# Patient Record
Sex: MALE | Race: WHITE | Employment: OTHER | URBAN - METROPOLITAN AREA
[De-identification: names, ages, dates, MRNs, and addresses within clinical notes are randomized per-mention and may not be internally consistent; named-entity substitution may affect disease eponyms.]

---

## 2017-01-06 ENCOUNTER — HOSPITAL ENCOUNTER (EMERGENCY)
Age: 67
Discharge: HOME | End: 2017-01-06
Attending: EMERGENCY MEDICINE | Admitting: EMERGENCY MEDICINE
Payer: MEDICARE

## 2017-01-06 ENCOUNTER — APPOINTMENT (OUTPATIENT)
Dept: GENERAL RADIOLOGY | Age: 67
End: 2017-01-06
Attending: EMERGENCY MEDICINE
Payer: MEDICARE

## 2017-01-06 VITALS
WEIGHT: 256 LBS | HEIGHT: 69 IN | BODY MASS INDEX: 37.92 KG/M2 | OXYGEN SATURATION: 93 % | TEMPERATURE: 98.2 F | HEART RATE: 61 BPM | DIASTOLIC BLOOD PRESSURE: 60 MMHG | RESPIRATION RATE: 16 BRPM | SYSTOLIC BLOOD PRESSURE: 124 MMHG

## 2017-01-06 DIAGNOSIS — J44.1 COPD EXACERBATION (HCC): Primary | ICD-10-CM

## 2017-01-06 LAB
ALBUMIN SERPL BCP-MCNC: 3.7 G/DL (ref 3.5–5)
ALBUMIN/GLOB SERPL: 1 {RATIO} (ref 1.1–2.2)
ALP SERPL-CCNC: 109 U/L (ref 45–117)
ALT SERPL-CCNC: 36 U/L (ref 12–78)
ANION GAP BLD CALC-SCNC: 9 MMOL/L (ref 5–15)
ARTERIAL PATENCY WRIST A: YES
AST SERPL W P-5'-P-CCNC: 24 U/L (ref 15–37)
BASE EXCESS BLDA CALC-SCNC: 3.1 MMOL/L
BASOPHILS # BLD AUTO: 0 K/UL (ref 0–0.1)
BASOPHILS # BLD: 0 % (ref 0–1)
BDY SITE: ABNORMAL
BILIRUB SERPL-MCNC: 0.9 MG/DL (ref 0.2–1)
BNP SERPL-MCNC: 420 PG/ML (ref 0–125)
BUN SERPL-MCNC: 19 MG/DL (ref 6–20)
BUN/CREAT SERPL: 16 (ref 12–20)
CALCIUM SERPL-MCNC: 10.1 MG/DL (ref 8.5–10.1)
CHLORIDE SERPL-SCNC: 96 MMOL/L (ref 97–108)
CK MB CFR SERPL CALC: 1.5 % (ref 0–2.5)
CK MB SERPL-MCNC: 6.7 NG/ML (ref 5–25)
CK SERPL-CCNC: 460 U/L (ref 39–308)
CO2 SERPL-SCNC: 29 MMOL/L (ref 21–32)
CREAT SERPL-MCNC: 1.17 MG/DL (ref 0.7–1.3)
EOSINOPHIL # BLD: 0.1 K/UL (ref 0–0.4)
EOSINOPHIL NFR BLD: 1 % (ref 0–7)
ERYTHROCYTE [DISTWIDTH] IN BLOOD BY AUTOMATED COUNT: 14.5 % (ref 11.5–14.5)
GLOBULIN SER CALC-MCNC: 3.7 G/DL (ref 2–4)
GLUCOSE SERPL-MCNC: 213 MG/DL (ref 65–100)
HCO3 BLDA-SCNC: 29 MMOL/L (ref 22–26)
HCT VFR BLD AUTO: 35.6 % (ref 36.6–50.3)
HGB BLD-MCNC: 12.3 G/DL (ref 12.1–17)
LYMPHOCYTES # BLD AUTO: 16 % (ref 12–49)
LYMPHOCYTES # BLD: 2.3 K/UL (ref 0.8–3.5)
MCH RBC QN AUTO: 30 PG (ref 26–34)
MCHC RBC AUTO-ENTMCNC: 34.6 G/DL (ref 30–36.5)
MCV RBC AUTO: 86.8 FL (ref 80–99)
MONOCYTES # BLD: 1.1 K/UL (ref 0–1)
MONOCYTES NFR BLD AUTO: 8 % (ref 5–13)
NEUTS SEG # BLD: 10.9 K/UL (ref 1.8–8)
NEUTS SEG NFR BLD AUTO: 75 % (ref 32–75)
PCO2 BLDA: 49 MMHG (ref 35–45)
PH BLDA: 7.39 [PH] (ref 7.35–7.45)
PLATELET # BLD AUTO: 311 K/UL (ref 150–400)
PO2 BLDA: 71 MMHG (ref 80–100)
POTASSIUM SERPL-SCNC: 3.7 MMOL/L (ref 3.5–5.1)
PROT SERPL-MCNC: 7.4 G/DL (ref 6.4–8.2)
RBC # BLD AUTO: 4.1 M/UL (ref 4.1–5.7)
SAO2 % BLD: 94 % (ref 92–97)
SAO2% DEVICE SAO2% SENSOR NAME: ABNORMAL
SODIUM SERPL-SCNC: 134 MMOL/L (ref 136–145)
SPECIMEN SITE: ABNORMAL
TROPONIN I SERPL-MCNC: <0.04 NG/ML
WBC # BLD AUTO: 14.3 K/UL (ref 4.1–11.1)

## 2017-01-06 PROCEDURE — 99285 EMERGENCY DEPT VISIT HI MDM: CPT

## 2017-01-06 PROCEDURE — 84484 ASSAY OF TROPONIN QUANT: CPT | Performed by: EMERGENCY MEDICINE

## 2017-01-06 PROCEDURE — 93971 EXTREMITY STUDY: CPT

## 2017-01-06 PROCEDURE — 82550 ASSAY OF CK (CPK): CPT | Performed by: EMERGENCY MEDICINE

## 2017-01-06 PROCEDURE — 36415 COLL VENOUS BLD VENIPUNCTURE: CPT | Performed by: EMERGENCY MEDICINE

## 2017-01-06 PROCEDURE — 85025 COMPLETE CBC W/AUTO DIFF WBC: CPT | Performed by: EMERGENCY MEDICINE

## 2017-01-06 PROCEDURE — 93005 ELECTROCARDIOGRAM TRACING: CPT

## 2017-01-06 PROCEDURE — 80053 COMPREHEN METABOLIC PANEL: CPT | Performed by: EMERGENCY MEDICINE

## 2017-01-06 PROCEDURE — 83880 ASSAY OF NATRIURETIC PEPTIDE: CPT | Performed by: EMERGENCY MEDICINE

## 2017-01-06 PROCEDURE — 82803 BLOOD GASES ANY COMBINATION: CPT | Performed by: EMERGENCY MEDICINE

## 2017-01-06 PROCEDURE — 71010 XR CHEST PORT: CPT

## 2017-01-06 PROCEDURE — 36600 WITHDRAWAL OF ARTERIAL BLOOD: CPT | Performed by: EMERGENCY MEDICINE

## 2017-01-06 RX ORDER — AZITHROMYCIN 250 MG/1
TABLET, FILM COATED ORAL
Qty: 6 TAB | Refills: 0 | Status: SHIPPED | OUTPATIENT
Start: 2017-01-06 | End: 2017-12-30

## 2017-01-06 RX ORDER — SODIUM CHLORIDE 0.9 % (FLUSH) 0.9 %
5-10 SYRINGE (ML) INJECTION EVERY 8 HOURS
Status: DISCONTINUED | OUTPATIENT
Start: 2017-01-06 | End: 2017-01-06 | Stop reason: HOSPADM

## 2017-01-06 RX ORDER — SODIUM CHLORIDE 0.9 % (FLUSH) 0.9 %
5-10 SYRINGE (ML) INJECTION AS NEEDED
Status: DISCONTINUED | OUTPATIENT
Start: 2017-01-06 | End: 2017-01-06 | Stop reason: HOSPADM

## 2017-01-06 RX ORDER — METHYLPREDNISOLONE 4 MG/1
TABLET ORAL
Qty: 1 DOSE PACK | Refills: 0 | Status: SHIPPED | OUTPATIENT
Start: 2017-01-06 | End: 2017-12-10

## 2017-01-06 NOTE — ED NOTES
Pt reported x1 hour before he waiting for the bus to come to ER for his right leg pain and he started sob,so he called EMS to come here. Denies fever,chills,chest pain. C/o right leg pain,its swollen and red. Hx of blood clot in this leg. Emergency Department Nursing Plan of Care       The Nursing Plan of Care is developed from the Nursing assessment and Emergency Department Attending provider initial evaluation. The plan of care may be reviewed in the ED Provider note.     The Plan of Care was developed with the following considerations:   Patient / Family readiness to learn indicated by:verbalized understanding  Persons(s) to be included in education: patient  Barriers to Learning/Limitations:No    Signed     Franci Au RN    1/6/2017   10:35 AM

## 2017-01-06 NOTE — ED PROVIDER NOTES
Patient is a 77 y.o. male presenting with shortness of breath and leg pain. The history is provided by the patient. No  was used. Shortness of Breath   This is a new problem. The current episode started 2 days ago. The problem has not changed since onset. Associated symptoms include cough, sputum production, orthopnea, leg pain and leg swelling. Pertinent negatives include no fever and no chest pain. Risk factors include smoking and recent prolonged sitting. Treatments tried: xaralto. He has had prior hospitalizations. Associated medical issues include COPD. Leg Pain       Pt poor historian, widely distributed medical care. Presents today complaining of increased SOB and leg swelling after flying from [place]. States he takes xarelto for DVTs in  and [place], no PE. States he had stents [place] in [place]. Some orthopnea, but mostly cough, no CP. Past Medical History:   Diagnosis Date    COPD      bronchitis    Diabetes ([facility] [address]        Past Surgical History:   Procedure Laterality Date    Hx coronary stent placement           History reviewed. No pertinent family history. Social History     Social History    Marital status: SINGLE     Spouse name: N/A    Number of children: N/A    Years of education: N/A     Occupational History    Not on file. Social History Main Topics    Smoking status: Current Every Day Smoker     Packs/day: 1.00    Smokeless tobacco: Never Used    Alcohol use No    Drug use: Yes     Special: Marijuana    Sexual activity: Not Currently     Other Topics Concern    Not on file     Social History Narrative         ALLERGIES: Review of patient's allergies indicates no known allergies. Review of Systems   Constitutional: Negative for fever. Respiratory: Positive for cough, sputum production and shortness of breath. Cardiovascular: Positive for orthopnea and leg swelling. Negative for chest pain.    All other systems reviewed and are negative. Vitals:    01/06/17 1026   BP: 146/61   Pulse: 81   Resp: 16   Temp: 98.2 °F (36.8 °C)   SpO2: 100%   Weight: 116.1 kg (256 lb)   Height: 5' 9\" (1.753 m)            Physical Exam   Constitutional: He is oriented to person, place, and time. He appears well-developed and well-nourished. Unkempt white male, in NAD   HENT:   Head: Normocephalic and atraumatic. Nose: Nose normal.   Mouth/Throat: Oropharynx is clear and moist. No oropharyngeal exudate. Eyes: Conjunctivae and EOM are normal. Pupils are equal, round, and reactive to light. Right eye exhibits no discharge. Left eye exhibits no discharge. No scleral icterus. Neck: Normal range of motion. Neck supple. Cardiovascular: Normal rate, regular rhythm and normal heart sounds. No murmur heard. Pulmonary/Chest: Effort normal and breath sounds normal. No respiratory distress. He has no wheezes. He has no rales. Congested cough   Abdominal: Soft. He exhibits no distension. There is no tenderness. Obese     Musculoskeletal: Normal range of motion. He exhibits edema. He exhibits no tenderness. Redness bilat LE with trace-1+ pitting edema, no palp cord   Lymphadenopathy:     He has no cervical adenopathy. Neurological: He is alert and oriented to person, place, and time. Skin: Skin is warm and dry. No rash noted. He is not diaphoretic. There is erythema. Psychiatric: He has a normal mood and affect. His behavior is normal.   Nursing note and vitals reviewed.        MDM  ED Course       Procedures

## 2017-01-06 NOTE — PROCEDURES
[facility]  *** FINAL REPORT ***    Name: Tonya Christiansen  MRN: [id number]  : 15 Feb 1950  HIS Order #: [id number]  [address] [facility] Visit #: [id number]  Date: 2017    TYPE OF TEST: Peripheral Venous Testing    REASON FOR TEST  Pain in limb, Limb swelling    Right Leg:-  Deep venous thrombosis:           No  Superficial venous thrombosis:    No  Deep venous insufficiency:        Not examined  Superficial venous insufficiency: Not examined      INTERPRETATION/FINDINGS  Right leg :  1. Deep vein(s) visualized include the common femoral, proximal  femoral, mid femoral, distal femoral, popliteal(above knee),  popliteal(fossa), popliteal(below knee), posterior tibial and peroneal   veins. 2. No evidence of deep venous thrombosis detected in the veins  visualized. 3. No evidence of deep vein thrombosis in the contralateral common  femoral vein. 4. Superficial vein(s) visualized include the great saphenous vein. 5. No evidence of superficial thrombosis detected. ADDITIONAL COMMENTS    I have personally reviewed the data relevant to the interpretation of  this  study. TECHNOLOGIST: NICOLA Bull  Signed: 2017 12:18 PM    PHYSICIAN: Salena Cunha.  Corrie Coffman MD  Signed: 2017 04:49 PM

## 2017-01-06 NOTE — DISCHARGE INSTRUCTIONS
Chronic Obstructive Pulmonary Disease (COPD): Care Instructions  Your Care Instructions    Chronic obstructive pulmonary disease (COPD) is a general term for a group of lung diseases, including emphysema and chronic bronchitis. People with COPD have decreased airflow in and out of the lungs, which makes it hard to breathe. The airways also can get clogged with thick mucus. Cigarette smoking is a major cause of COPD. Although there is no cure for COPD, you can slow its progress. Following your treatment plan and taking care of yourself can help you feel better and live longer. Follow-up care is a key part of your treatment and safety. Be sure to make and go to all appointments, and call your doctor if you are having problems. It's also a good idea to know your test results and keep a list of the medicines you take. How can you care for yourself at home? Staying healthy  · Do not smoke. This is the most important step you can take to prevent more damage to your lungs. If you need help quitting, talk to your doctor about stop-smoking programs and medicines. These can increase your chances of quitting for good. · Avoid colds and flu. Get a pneumococcal vaccine shot. If you have had one before, ask your doctor whether you need a second dose. Get the flu vaccine every fall. If you must be around people with colds or the flu, wash your hands often. · Avoid secondhand smoke, air pollution, and high altitudes. Also avoid cold, dry air and hot, humid air. Stay at home with your windows closed when air pollution is bad. Medicines and oxygen therapy  · Take your medicines exactly as prescribed. Call your doctor if you think you are having a problem with your medicine. · You may be taking medicines such as:  ¨ Bronchodilators. These help open your airways and make breathing easier. Bronchodilators are either short-acting (work for 6 to 9 hours) or long-acting (work for 24 hours).  You inhale most bronchodilators, so they start to act quickly. Always carry your quick-relief inhaler with you in case you need it while you are away from home. ¨ Corticosteroids (prednisone, budesonide). These reduce airway inflammation. They come in pill or inhaled form. You must take these medicines every day for them to work well. · A spacer may help you get more inhaled medicine to your lungs. Ask your doctor or pharmacist if a spacer is right for you. If it is, ask how to use it properly. · Do not take any vitamins, over-the-counter medicine, or herbal products without talking to your doctor first.  · If your doctor prescribed antibiotics, take them as directed. Do not stop taking them just because you feel better. You need to take the full course of antibiotics. · Oxygen therapy boosts the amount of oxygen in your blood and helps you breathe easier. Use the flow rate your doctor has recommended, and do not change it without talking to your doctor first.  Activity  · Get regular exercise. Walking is an easy way to get exercise. Start out slowly, and walk a little more each day. · Pay attention to your breathing. You are exercising too hard if you cannot talk while you are exercising. · Take short rest breaks when doing household chores and other activities. · Learn breathing methods--such as breathing through pursed lips--to help you become less short of breath. · If your doctor has not set you up with a pulmonary rehabilitation program, talk to him or her about whether rehab is right for you. Rehab includes exercise programs, education about your disease and how to manage it, help with diet and other changes, and emotional support. Diet  · Eat regular, healthy meals. Use bronchodilators about 1 hour before you eat to make it easier to eat. Eat several small meals instead of three large ones. Drink beverages at the end of the meal. Avoid foods that are hard to chew.   · Eat foods that contain protein so that you do not lose muscle mass.  Mental health  · Talk to your family, friends, or a therapist about your feelings. It is normal to feel frightened, angry, hopeless, helpless, and even guilty. Talking openly about bad feelings can help you cope. If these feelings last, talk to your doctor. When should you call for help? Call 911 anytime you think you may need emergency care. For example, call if:  · You have severe trouble breathing. Call your doctor now or seek immediate medical care if:  · You have new or worse trouble breathing. · You cough up blood. · You have a fever. Watch closely for changes in your health, and be sure to contact your doctor if:  · You cough more deeply or more often, especially if you notice more mucus or a change in the color of your mucus. · You have new or worse swelling in your legs or belly. · You are not getting better as expected. Where can you learn more? Go to http://dank-damien.info/. Mehnaz Colon in the search box to learn more about \"Chronic Obstructive Pulmonary Disease (COPD): Care Instructions. \"  Current as of: May 23, 2016  Content Version: 11.1  © 7835-4740 [employer]. Care instructions adapted under license by [employer] (which disclaims liability or warranty for this information). If you have questions about a medical condition or this instruction, always ask your healthcare professional. [address] any warranty or liability for your use of this information.

## 2017-01-06 NOTE — CONSULTS
Cardiology consultation:    I was asked by Dr. Quezada to assess this 78-year-old male who came to the emergency room with complaints related to leg swelling and pain. He appears to have cellulitis in the right lower leg, and he is supposed to be on treatment with 1 of the novel anticoagulants for proven DVT. Unfortunately, he travels a great deal and he has had hospital care in several major cities around the [facility] over the last few months. He gives a history of having been treated in [place] more recently but they deny any record of contact with this patient. He seems to have some cognitive challenges and he is also hypersomnolent, frequently falling asleep during the assessment. Cardiology consultation was requested because of an abnormal electrocardiogram performed as part of routine surveillance during the emergency room visit. The patient denies chest pain as well as dyspnea. He denies palpitations and syncope. He is probably quite inactive because of his leg but he denies any other source of activity intolerance. He denies PND and orthopnea. He does however admit to previous PCI, stating that he has a stent that was placed inside of an older stent suggesting that he has had in-stent restenosis. He is quite unclear as to where this may have been done. It is impossible to glean specific history regarding what his symptoms might have been like before PCI was done. On examination, the patient is breathing comfortably in supine position with a respiratory rate of 16/min. Blood pressure is 124/60. Pulse is 62 and regular. Peripheral pulses are intact. His right leg is inflamed with pebbly skin below the knee in the tibial area and with considerable calf tenderness although specific cord structures were not palpable. The left leg is somewhat edematous but without any sign of lymphedema and without tenderness or redness. Tibial pulses are present on both sides.   There is minimal pedal edema without inflammation. The area of inflammation is demarcated below the knee and at the ankle. Lungs are clear to auscultation with limited cooperation for inspiratory effort. Cardiac auscultation shows regular rhythm with diminished volume of S1-S2, no audible murmur or gallop, no ectopy. Neck vessels are normal.    Twelve-lead EKG shows sinus rhythm with very small Q waves of insignificant proportions in the inferior leads. There is also inferior and slight lateral downward convex ST segment elevation that could suggest a variant early repolarization abnormality. The contour does not strongly suggest an ischemic origin. Precordial R-wave progression is normal, corrected QT interval was normal.  No arrhythmias have been observed. A second electrocardiogram closely matches the first without any sign of progressive changes. White blood cell count is 14.3 with 75% neutrophils, no immature forms. Hemoglobin is 12.3 with normocytic indices. Potassium is 3.7 with sodium of 134. Creatinine is 1.17. Albumin is normal at 3.7. CK is 460 with mild elevation of MB, troponin is normal.  NT proBNP is 420. Chest x-ray  shows mildly prominent left ventricle, otherwise unremarkable findings:            Venous ultrasound shows no sign of deep vein thrombosis.     Impression: History of coronary artery disease with multiple PCI    EKG findings are probably baseline observations, there is no sign of ischemic activity at this time    Patient should be counseled that he needs centralized medical management and that fragmented care due to random travel is dangerous    Cellulitis left lower extremity with no sign of venous thrombosis    Suggest: The EKG does not show evolutionary changes and cardiac markers are more suggestive of a skeletal abnormality    No further cardiac testing is suggested at this time    Thank you for this consultation    Jeremy Bryant MD [facility]

## 2017-01-08 LAB
ATRIAL RATE: 63 BPM
ATRIAL RATE: 63 BPM
CALCULATED P AXIS, ECG09: 35 DEGREES
CALCULATED P AXIS, ECG09: 41 DEGREES
CALCULATED R AXIS, ECG10: 45 DEGREES
CALCULATED R AXIS, ECG10: 49 DEGREES
CALCULATED T AXIS, ECG11: 52 DEGREES
CALCULATED T AXIS, ECG11: 57 DEGREES
DIAGNOSIS, 93000: NORMAL
DIAGNOSIS, 93000: NORMAL
P-R INTERVAL, ECG05: 180 MS
P-R INTERVAL, ECG05: 184 MS
Q-T INTERVAL, ECG07: 408 MS
Q-T INTERVAL, ECG07: 408 MS
QRS DURATION, ECG06: 94 MS
QRS DURATION, ECG06: 96 MS
QTC CALCULATION (BEZET), ECG08: 417 MS
QTC CALCULATION (BEZET), ECG08: 417 MS
VENTRICULAR RATE, ECG03: 63 BPM
VENTRICULAR RATE, ECG03: 63 BPM

## 2017-12-10 ENCOUNTER — HOSPITAL ENCOUNTER (EMERGENCY)
Age: 67
Discharge: HOME | End: 2017-12-10
Attending: EMERGENCY MEDICINE
Payer: MEDICARE

## 2017-12-10 VITALS
SYSTOLIC BLOOD PRESSURE: 152 MMHG | HEIGHT: 69 IN | HEART RATE: 66 BPM | DIASTOLIC BLOOD PRESSURE: 80 MMHG | RESPIRATION RATE: 18 BRPM | WEIGHT: 233 LBS | OXYGEN SATURATION: 98 % | BODY MASS INDEX: 34.51 KG/M2 | TEMPERATURE: 97.2 F
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
## 2017-12-10 DIAGNOSIS — R21 RASH: Primary | ICD-10-CM

## 2017-12-10 PROCEDURE — A9270 NON-COVERED ITEM OR SERVICE: HCPCS | Performed by: EMERGENCY MEDICINE

## 2017-12-10 PROCEDURE — 74011636637 HC RX REV CODE- 636/637: Performed by: EMERGENCY MEDICINE

## 2017-12-10 PROCEDURE — 99283 EMERGENCY DEPT VISIT LOW MDM: CPT

## 2017-12-10 PROCEDURE — 74011250637 HC RX REV CODE- 250/637: Performed by: EMERGENCY MEDICINE

## 2017-12-10 RX ORDER — TRIAMCINOLONE ACETONIDE 0.25 MG/G
OINTMENT TOPICAL 2 TIMES DAILY
Qty: 30 G | Refills: 0 | Status: SHIPPED | OUTPATIENT
Start: 2017-12-10 | End: 2018-01-12 | Stop reason: ALTCHOICE

## 2017-12-10 RX ORDER — FAMOTIDINE 20 MG/1
20 TABLET, FILM COATED ORAL 2 TIMES DAILY
Qty: 20 TAB | Refills: 0 | Status: SHIPPED | OUTPATIENT
Start: 2017-12-10 | End: 2017-12-20

## 2017-12-10 RX ORDER — DIPHENHYDRAMINE HCL 25 MG
25 TABLET ORAL
Qty: 20 TAB | Refills: 0 | Status: SHIPPED | OUTPATIENT
Start: 2017-12-10 | End: 2018-01-08 | Stop reason: SDUPTHER

## 2017-12-10 RX ORDER — LORATADINE 10 MG/1
10 TABLET ORAL ONCE
Status: COMPLETED | OUTPATIENT
Start: 2017-12-10 | End: 2017-12-10

## 2017-12-10 RX ORDER — PREDNISONE 20 MG/1
40 TABLET ORAL
Status: COMPLETED | OUTPATIENT
Start: 2017-12-10 | End: 2017-12-10

## 2017-12-10 RX ORDER — FAMOTIDINE 20 MG/1
20 TABLET, FILM COATED ORAL
Status: COMPLETED | OUTPATIENT
Start: 2017-12-10 | End: 2017-12-10

## 2017-12-10 RX ORDER — PREDNISONE 20 MG/1
20 TABLET ORAL DAILY
Qty: 6 TAB | Refills: 0 | Status: SHIPPED | OUTPATIENT
Start: 2017-12-10 | End: 2017-12-16

## 2017-12-10 RX ADMIN — FAMOTIDINE 20 MG: 20 TABLET, FILM COATED ORAL at 02:36

## 2017-12-10 RX ADMIN — LORATADINE 10 MG: 10 TABLET ORAL at 02:36

## 2017-12-10 RX ADMIN — PREDNISONE 40 MG: 20 TABLET ORAL at 02:36

## 2017-12-10 NOTE — ED PROVIDER NOTES
EMERGENCY DEPARTMENT HISTORY AND PHYSICAL EXAM      Date: 12/10/2017  Patient Name: Sagar Cid    History of Presenting Illness     Chief Complaint   Patient presents with    Rash     Pt with c/o red itchy rash all over his body since last Thursday. Pt states the place where he stays has bed bugs. History Provided By: Patient    HPI: Sagar Cid, 79 y.o. male with PMHx significant for COPD and DM who presents ambulatory to the ED with cc of gradually worsening diffuse rash x 1 week. He denies any associated symptoms. Pt denies use of medication or any other modifying factors. He reports seeing bed bug at the place where he is currently living. Pt denies hx of similar symptoms. He specifically denies any fevers, chills, nausea, vomiting, abdominal pain, CP, SOB, or HA. Pt states fsbs this evening was 99 prior to arrival    + tobacco use (1 ppd), - EtOH use, + Illicit drug use    PCP: None    There are no other complaints, changes, or physical findings at this time. Current Facility-Administered Medications   Medication Dose Route Frequency Provider Last Rate Last Dose    predniSONE (DELTASONE) tablet 40 mg  40 mg Oral NOW Hemalatha Johnson MD        famotidine (PEPCID) tablet 20 mg  20 mg Oral NOW Hemalatha Johnson MD        loratadine (CLARITIN) tablet 10 mg  10 mg Oral ONCE Hemalatha Johnson MD         Current Outpatient Prescriptions   Medication Sig Dispense Refill    diphenhydrAMINE (BENADRYL) 25 mg tablet Take 1 Tab by mouth every six (6) hours as needed for Sleep or Itching. 20 Tab 0    predniSONE (DELTASONE) 20 mg tablet Take 1 Tab by mouth daily for 6 days. With Breakfast 6 Tab 0    famotidine (PEPCID) 20 mg tablet Take 1 Tab by mouth two (2) times a day for 10 days. 20 Tab 0    triamcinolone acetonide (KENALOG) 0.025 % ointment Apply  to affected area two (2) times a day.  use thin layer 30 g 0    azithromycin (ZITHROMAX Z-EMILIANA) 250 mg tablet Take two tablets today then one tablet daily 6 Tab 0    metFORMIN (GLUCOPHAGE) 500 mg tablet Take  by mouth two (2) times daily (with meals).  aspirin delayed-release 81 mg tablet Take  by mouth daily.  clopidogrel (PLAVIX) 75 mg tablet Take  by mouth daily.  tiotropium (SPIRIVA) 18 mcg inhalation capsule Take 1 Cap by inhalation daily.  fluticasone-salmeterol (ADVAIR) 100-50 mcg/dose diskus inhaler Take 1 Puff by inhalation every twelve (12) hours.  insulin aspart (NOVOLOG) 100 unit/mL injection by SubCUTAneous route.  rivaroxaban (XARELTO) 10 mg tablet Take 20 mg by mouth daily.  metoprolol tartrate (LOPRESSOR) 25 mg tablet Take 25 mg by mouth two (2) times a day.  losartan-hydrochlorothiazide (HYZAAR) 50-12.5 mg per tablet Take 1 Tab by mouth daily.  atorvastatin (LIPITOR) 20 mg tablet Take 20 mg by mouth daily.  nitroglycerin (NITROSTAT) 0.4 mg SL tablet 0.4 mg by SubLINGual route every five (5) minutes as needed for Chest Pain.  acetaminophen (TYLENOL) 325 mg tablet Take 325 mg by mouth every four (4) hours as needed for Pain.  nicotine (NICODERM CQ) 7 mg/24 hr 1 Patch by TransDERmal route every twenty-four (24) hours.  oxyCODONE-acetaminophen (PERCOCET) 5-325 mg per tablet Take 1 Tab by mouth every four (4) hours as needed for Pain.  cyclobenzaprine (FLEXERIL) 10 mg tablet Take 10 mg by mouth three (3) times daily as needed for Muscle Spasm(s).  albuterol (PROVENTIL HFA, VENTOLIN HFA, PROAIR HFA) 90 mcg/actuation inhaler Take 2 Puffs by inhalation.  terbinafine HCl (LAMISIL) 1 % topical cream Apply  to affected area two (2) times a day. 30 g 0       Past History     Past Medical History:  Past Medical History:   Diagnosis Date    COPD     bronchitis    Diabetes ([address]        Past Surgical History:  Past Surgical History:   Procedure Laterality Date    HX CORONARY STENT PLACEMENT         Family History:  History reviewed. No pertinent family history.     Social History:  Social History   Substance Use Topics    Smoking status: Current Every Day Smoker     Packs/day: 1.00    Smokeless tobacco: Never Used    Alcohol use No       Allergies:  No Known Allergies      Review of Systems   Review of Systems   Constitutional: Negative for chills and fever. HENT: Negative for congestion and rhinorrhea. Eyes: Negative for visual disturbance. Respiratory: Negative for cough and shortness of breath. Cardiovascular: Negative for chest pain. Gastrointestinal: Negative for abdominal pain, diarrhea, nausea and vomiting. Genitourinary: Negative for difficulty urinating and dysuria. Musculoskeletal: Negative for arthralgias and back pain. Skin: Positive for rash (diffuse). Neurological: Negative for dizziness, light-headedness and headaches. Physical Exam   Physical Exam   Constitutional: He is oriented to person, place, and time. He appears well-developed and well-nourished. Cardiovascular: Normal rate, regular rhythm, normal heart sounds and intact distal pulses. Pulmonary/Chest: Effort normal and breath sounds normal. No respiratory distress. Abdominal: Soft. There is no tenderness. Neurological: He is alert and oriented to person, place, and time. Skin: Skin is warm and dry. Diffuse macular papular rash to the bilateral upper extremities. Hives present. Nursing note and vitals reviewed. Medical Decision Making   I am the first provider for this patient. I reviewed the vital signs, available nursing notes, past medical history, past surgical history, family history and social history. Vital Signs-Reviewed the patient's vital signs.   Patient Vitals for the past 12 hrs:   Temp Pulse Resp BP SpO2   12/10/17 0216 97.2 °F (36.2 °C) 66 18 152/80 98 %     Records Reviewed: Nursing Notes and Old Medical Records    Provider Notes (Medical Decision Making):     Contact dermatitis, hives, bed bug infestation, scabies    ED Course:   Initial assessment performed. The patients presenting problems have been discussed, and they are in agreement with the care plan formulated and outlined with them. I have encouraged them to ask questions as they arise throughout their visit. Disposition:    DISCHARGE NOTE  2:32 AM  The patient has been re-evaluated and is ready for discharge. Reviewed available results with patient. Counseled patient on diagnosis and care plan. Patient has expressed understanding, and all questions have been answered. Patient agrees with plan and agrees to follow up as recommended, or return to the ED if their symptoms worsen. Discharge instructions have been provided and explained to the patient, along with reasons to return to the ED. PLAN:  1. Current Discharge Medication List      START taking these medications    Details   diphenhydrAMINE (BENADRYL) 25 mg tablet Take 1 Tab by mouth every six (6) hours as needed for Sleep or Itching. Qty: 20 Tab, Refills: 0      predniSONE (DELTASONE) 20 mg tablet Take 1 Tab by mouth daily for 6 days. With Breakfast  Qty: 6 Tab, Refills: 0      famotidine (PEPCID) 20 mg tablet Take 1 Tab by mouth two (2) times a day for 10 days. Qty: 20 Tab, Refills: 0      triamcinolone acetonide (KENALOG) 0.025 % ointment Apply  to affected area two (2) times a day. use thin layer  Qty: 30 g, Refills: 0         STOP taking these medications       methylPREDNISolone (MEDROL, EMILIANA,) 4 mg tablet Comments:   Reason for Stoppin.   Follow-up Information     Follow up With Details Comments Contact Info    [facility] EMERGENCY DEPT  If symptoms worsen [address] [facility]    [facility] Schedule an appointment as soon as possible for a visit  [address] [facility] [address] [facility]  [phone]        Return to ED if worse     Diagnosis     Clinical Impression:   1. Rash    2. Bedbug bite, initial encounter        Attestations:     This note is prepared by Margot Arrington, acting as Scribe for Gamal Quan MD.    Gamal Quan MD: The scribe's documentation has been prepared under my direction and personally reviewed by me in its entirety. I confirm that the note above accurately reflects all work, treatment, procedures, and medical decision making performed by me.

## 2017-12-10 NOTE — DISCHARGE INSTRUCTIONS
Rash: Care Instructions  Your Care Instructions  A rash is any irritation or inflammation of the skin. Rashes have many possible causes, including allergy, infection, illness, heat, and emotional stress. Follow-up care is a key part of your treatment and safety. Be sure to make and go to all appointments, and call your doctor if you are having problems. It's also a good idea to know your test results and keep a list of the medicines you take. How can you care for yourself at home? · Wash the area with water only. Soap can make dryness and itching worse. Pat dry. · Put cold, wet cloths on the rash to reduce itching. · Keep cool, and stay out of the sun. · Leave the rash open to the air as much of the time as possible. · Sometimes petroleum jelly (Vaseline) can help relieve the discomfort caused by a rash. A moisturizing lotion, such as Cetaphil, also may help. Calamine lotion may help for rashes caused by contact with something (such as a plant or soap) that irritated the skin. Use it 3 or 4 times a day. · If your doctor prescribed a cream, use it as directed. If your doctor prescribed medicine, take it exactly as directed. · If your rash itches so badly that it interferes with your normal activities, take an over-the-counter antihistamine, such as diphenhydramine (Benadryl) or loratadine (Claritin). Read and follow all instructions on the label. When should you call for help? Call your doctor now or seek immediate medical care if:  ? · You have signs of infection, such as:  ¨ Increased pain, swelling, warmth, or redness. ¨ Red streaks leading from the area. ¨ Pus draining from the area. ¨ A fever. ? · You have joint pain along with the rash. ? Watch closely for changes in your health, and be sure to contact your doctor if:  ? · Your rash is changing or getting worse. For example, call if you have pain along with the rash, the rash is spreading, or you have new blisters.    ? · You do not get better after 1 week. Where can you learn more? Go to http://dank-damien.info/. Enter P323 in the search box to learn more about \"Rash: Care Instructions. \"  Current as of: October 13, 2016  Content Version: 11.4  © 5029-4471 [employer]. Care instructions adapted under license by [employer] (which disclaims liability or warranty for this information). If you have questions about a medical condition or this instruction, always ask your healthcare professional. [place] [address] any warranty or liability for your use of this information. Bedbugs: Care Instructions  Your Care Instructions    Bedbugs are tiny bugs that feed on blood from animals or people. They have that name because they like to hide in bedding and mattresses. Bedbugs are not known to spread disease to people. But itching from the bites can be so bad that you may scratch hard enough to break the skin. That can lead to infection. The bites can also cause an allergic reaction in some people. The bugs can spread into cracks and crevices in the room and lay their eggs in anything that is in the room, including clothing and furniture. This makes them very hard to kill. Getting rid of bedbugs  The best way to get rid of bedbugs is to call a professional pest control company. They use special pesticides and other equipment to kill the bugs and their eggs. If you decide to buy your own pesticide, check the label. Make sure it says that it is for bedbugs. Be sure to follow the directions carefully. You may have to use the pesticide more than once. Do other cleaning steps such as:  · Vacuum often. Be sure to empty the vacuum after each use. If you use a vacuum bag, seal it and throw it out in an outdoor trash can. If you don't use a vacuum bag, empty the container and clean it with hot, soapy water. · Launder things that might hide bugs.  Washing and then drying items in a dryer on a hot setting is adequate to kill bedbugs in clothing or linens. Turn the dryer to the hottest setting that the fabric can handle. · Use mattress, box spring, and pillow (encasement) sacks to trap bed bugs and help get rid of them. Be sure to follow the directions on the package. After your mattress has been cleared of bedbugs, you can buy a special mattress cover that is made to keep bedbugs out of your mattress. Follow-up care is a key part of your treatment and safety. Be sure to make and go to all appointments, and call your doctor if you are having problems. It's also a good idea to know your test results and keep a list of the medicines you take. How can you care for yourself at home? · Wash the bites with soap to lower the chance of infection. Try not to scratch. · Use calamine lotion or an anti-itch cream to stop the itching. You can also hold an oatmeal-soaked washcloth on the itchy area for 15 minutes. You can buy an oatmeal powder, such as Aveeno Colloidal Oatmeal, in drugstores. · Use an ice pack to stop the swelling. When should you call for help? Call your doctor now or seek immediate medical care if:  ? · You have signs of infection, such as:  ¨ Increased pain, swelling, warmth, or redness. ¨ Red streaks leading from a bite area. ¨ Pus draining from a bite area. ¨ A fever. ? Watch closely for changes in your health, and be sure to contact your doctor if:  ? · Anyone else in your family has itching. ? · You do not get better as expected. Where can you learn more? Go to http://dank-damien.info/. Enter G246 in the search box to learn more about \"Bedbugs: Care Instructions. \"  Current as of: March 20, 2017  Content Version: 11.4  © 9668-1347 [employer]. Care instructions adapted under license by [employer] (which disclaims liability or warranty for this information).  If you have questions about a medical condition or this instruction, always ask your healthcare professional. [place] [address] any warranty or liability for your use of this information.

## 2017-12-10 NOTE — ED NOTES
Patient  given copy of dc instructions and 4 script(s). Patient  verbalized understanding of instructions and script (s). Patient given a current medication reconciliation form and verbalized understanding of their medications. Patient  verbalized understanding of the importance of discussing medications with  his or her physician or clinic they will be following up with. Patient alert and oriented and in no acute distress. Patient discharged home ambulatory.

## 2017-12-10 NOTE — ED NOTES
Pt arrived to ED with c/o itching, rash, hives, and beg bug problem. Pt reports the place where he is staying has bed bugs. No visible bed bugs seen on patient. Welts noted on arms. Pt is alert and orientated X 4; skin is intact; lungs are clear; pt breaths well on room air; Pt is in no acute distress. Will continue to monitor. See nursing assessment. Safety precautions in place; call light within reach. Emergency Department Nursing Plan of Care       The Nursing Plan of Care is developed from the Nursing assessment and Emergency Department Attending provider initial evaluation. The plan of care may be reviewed in the ED Provider note.     The Plan of Care was developed with the following considerations:   Patient / Family readiness to learn indicated by:verbalized understanding  Persons(s) to be included in education: patient  Barriers to Learning/Limitations:No    Signed     Be Albert RN    12/10/2017   2:27 AM

## 2017-12-30 ENCOUNTER — HOSPITAL ENCOUNTER (EMERGENCY)
Age: 67
Discharge: HOME | End: 2017-12-30
Attending: EMERGENCY MEDICINE
Payer: MEDICARE

## 2017-12-30 ENCOUNTER — APPOINTMENT (OUTPATIENT)
Dept: GENERAL RADIOLOGY | Age: 67
End: 2017-12-30
Attending: EMERGENCY MEDICINE
Payer: MEDICARE

## 2017-12-30 VITALS
HEART RATE: 92 BPM | OXYGEN SATURATION: 98 % | BODY MASS INDEX: 33.47 KG/M2 | TEMPERATURE: 98 F | WEIGHT: 226 LBS | DIASTOLIC BLOOD PRESSURE: 60 MMHG | SYSTOLIC BLOOD PRESSURE: 133 MMHG | RESPIRATION RATE: 21 BRPM | HEIGHT: 69 IN

## 2017-12-30 DIAGNOSIS — J44.1 ACUTE EXACERBATION OF CHRONIC OBSTRUCTIVE PULMONARY DISEASE (COPD) (HCC): Primary | ICD-10-CM

## 2017-12-30 LAB
ALBUMIN SERPL-MCNC: 3.6 G/DL (ref 3.5–5)
ALBUMIN/GLOB SERPL: 0.9 {RATIO} (ref 1.1–2.2)
ALP SERPL-CCNC: 111 U/L (ref 45–117)
ALT SERPL-CCNC: 38 U/L (ref 12–78)
ANION GAP SERPL CALC-SCNC: 9 MMOL/L (ref 5–15)
AST SERPL-CCNC: 25 U/L (ref 15–37)
BASOPHILS # BLD: 0 K/UL (ref 0–0.1)
BASOPHILS NFR BLD: 1 % (ref 0–1)
BILIRUB SERPL-MCNC: 0.6 MG/DL (ref 0.2–1)
BNP SERPL-MCNC: 244 PG/ML (ref 0–125)
BUN SERPL-MCNC: 18 MG/DL (ref 6–20)
BUN/CREAT SERPL: 15 (ref 12–20)
CALCIUM SERPL-MCNC: 10.2 MG/DL (ref 8.5–10.1)
CHLORIDE SERPL-SCNC: 100 MMOL/L (ref 97–108)
CK MB CFR SERPL CALC: 3.2 % (ref 0–2.5)
CK MB SERPL-MCNC: 2.3 NG/ML (ref 5–25)
CK SERPL-CCNC: 73 U/L (ref 39–308)
CO2 SERPL-SCNC: 28 MMOL/L (ref 21–32)
CREAT SERPL-MCNC: 1.19 MG/DL (ref 0.7–1.3)
EOSINOPHIL # BLD: 0.2 K/UL (ref 0–0.4)
EOSINOPHIL NFR BLD: 3 % (ref 0–7)
ERYTHROCYTE [DISTWIDTH] IN BLOOD BY AUTOMATED COUNT: 14.8 % (ref 11.5–14.5)
GLOBULIN SER CALC-MCNC: 4 G/DL (ref 2–4)
GLUCOSE SERPL-MCNC: 152 MG/DL (ref 65–100)
HCT VFR BLD AUTO: 45.3 % (ref 36.6–50.3)
HGB BLD-MCNC: 15.7 G/DL (ref 12.1–17)
LYMPHOCYTES # BLD: 1.3 K/UL (ref 0.8–3.5)
LYMPHOCYTES NFR BLD: 19 % (ref 12–49)
MCH RBC QN AUTO: 30.7 PG (ref 26–34)
MCHC RBC AUTO-ENTMCNC: 34.7 G/DL (ref 30–36.5)
MCV RBC AUTO: 88.6 FL (ref 80–99)
MONOCYTES # BLD: 0.8 K/UL (ref 0–1)
MONOCYTES NFR BLD: 12 % (ref 5–13)
NEUTS SEG # BLD: 4.5 K/UL (ref 1.8–8)
NEUTS SEG NFR BLD: 65 % (ref 32–75)
PLATELET # BLD AUTO: 310 K/UL (ref 150–400)
POTASSIUM SERPL-SCNC: 4 MMOL/L (ref 3.5–5.1)
PROT SERPL-MCNC: 7.6 G/DL (ref 6.4–8.2)
RBC # BLD AUTO: 5.11 M/UL (ref 4.1–5.7)
SODIUM SERPL-SCNC: 137 MMOL/L (ref 136–145)
TROPONIN I SERPL-MCNC: <0.04 NG/ML
WBC # BLD AUTO: 6.7 K/UL (ref 4.1–11.1)

## 2017-12-30 PROCEDURE — 74011000250 HC RX REV CODE- 250: Performed by: EMERGENCY MEDICINE

## 2017-12-30 PROCEDURE — 77030029684 HC NEB SM VOL KT MONA -A

## 2017-12-30 PROCEDURE — 71010 XR CHEST PORT: CPT

## 2017-12-30 PROCEDURE — 36415 COLL VENOUS BLD VENIPUNCTURE: CPT | Performed by: EMERGENCY MEDICINE

## 2017-12-30 PROCEDURE — 99284 EMERGENCY DEPT VISIT MOD MDM: CPT

## 2017-12-30 PROCEDURE — 83880 ASSAY OF NATRIURETIC PEPTIDE: CPT | Performed by: EMERGENCY MEDICINE

## 2017-12-30 PROCEDURE — 94640 AIRWAY INHALATION TREATMENT: CPT

## 2017-12-30 PROCEDURE — 85025 COMPLETE CBC W/AUTO DIFF WBC: CPT | Performed by: EMERGENCY MEDICINE

## 2017-12-30 PROCEDURE — 84484 ASSAY OF TROPONIN QUANT: CPT | Performed by: EMERGENCY MEDICINE

## 2017-12-30 PROCEDURE — 80053 COMPREHEN METABOLIC PANEL: CPT | Performed by: EMERGENCY MEDICINE

## 2017-12-30 PROCEDURE — 82550 ASSAY OF CK (CPK): CPT | Performed by: EMERGENCY MEDICINE

## 2017-12-30 PROCEDURE — 93005 ELECTROCARDIOGRAM TRACING: CPT

## 2017-12-30 RX ORDER — SODIUM CHLORIDE 0.9 % (FLUSH) 0.9 %
5-10 SYRINGE (ML) INJECTION EVERY 8 HOURS
Status: DISCONTINUED | OUTPATIENT
Start: 2017-12-30 | End: 2017-12-30 | Stop reason: HOSPADM

## 2017-12-30 RX ORDER — SODIUM CHLORIDE 0.9 % (FLUSH) 0.9 %
5-10 SYRINGE (ML) INJECTION AS NEEDED
Status: DISCONTINUED | OUTPATIENT
Start: 2017-12-30 | End: 2017-12-30 | Stop reason: HOSPADM

## 2017-12-30 RX ORDER — METHYLPREDNISOLONE 4 MG/1
TABLET ORAL
Qty: 1 DOSE PACK | Refills: 0 | Status: SHIPPED | OUTPATIENT
Start: 2017-12-30 | End: 2018-01-12 | Stop reason: ALTCHOICE

## 2017-12-30 RX ORDER — AZITHROMYCIN 250 MG/1
TABLET, FILM COATED ORAL
Qty: 6 TAB | Refills: 0 | Status: SHIPPED | OUTPATIENT
Start: 2017-12-30 | End: 2018-01-12 | Stop reason: ALTCHOICE

## 2017-12-30 RX ORDER — IPRATROPIUM BROMIDE AND ALBUTEROL SULFATE 2.5; .5 MG/3ML; MG/3ML
3 SOLUTION RESPIRATORY (INHALATION)
Status: COMPLETED | OUTPATIENT
Start: 2017-12-30 | End: 2017-12-30

## 2017-12-30 RX ADMIN — IPRATROPIUM BROMIDE AND ALBUTEROL SULFATE 3 ML: .5; 3 SOLUTION RESPIRATORY (INHALATION) at 17:17

## 2017-12-30 NOTE — ED PROVIDER NOTES
EMERGENCY DEPARTMENT HISTORY AND PHYSICAL EXAM      Date: 12/30/2017  Patient Name: Martha Perez    History of Presenting Illness     Chief Complaint   Patient presents with    Cough     x 3 days       History Provided By: Patient    HPI: Martha Perez, 79 y.o. male with PMHx significant for COPD and DM, presents ambulatory to the ED with cc of a worsening productive cough with clear sputum for 3 days that is exacerbated with heat. He states he has tried OTC medication Mucinex and Nyquil but not an albuterol inhaler. He notes that he is a smoker. Pt denies any fever or CP. PCP: None    There are no other complaints, changes, or physical findings at this time. Current Facility-Administered Medications   Medication Dose Route Frequency Provider Last Rate Last Dose    sodium chloride (NS) flush 5-10 mL  5-10 mL IntraVENous Q8H Kodak Patterson MD        sodium chloride (NS) flush 5-10 mL  5-10 mL IntraVENous PRN Kodak Patterson MD         Current Outpatient Prescriptions   Medication Sig Dispense Refill    dextromethorphan-guaiFENesin (ROBITUSSIN-DM)  mg/5 mL syrup Take 10 mL by mouth every six (6) hours as needed for Cough. 240 mL 0    azithromycin (ZITHROMAX Z-EMILIANA) 250 mg tablet Take two tablets today then one tablet daily 6 Tab 0    methylPREDNISolone (MEDROL DOSEPACK) 4 mg tablet Sig as dir 1 Dose Pack 0    diphenhydrAMINE (BENADRYL) 25 mg tablet Take 1 Tab by mouth every six (6) hours as needed for Sleep or Itching. 20 Tab 0    triamcinolone acetonide (KENALOG) 0.025 % ointment Apply  to affected area two (2) times a day. use thin layer 30 g 0    metFORMIN (GLUCOPHAGE) 500 mg tablet Take  by mouth two (2) times daily (with meals).  aspirin delayed-release 81 mg tablet Take  by mouth daily.  clopidogrel (PLAVIX) 75 mg tablet Take  by mouth daily.  tiotropium (SPIRIVA) 18 mcg inhalation capsule Take 1 Cap by inhalation daily.       fluticasone-salmeterol (ADVAIR) 100-50 mcg/dose diskus inhaler Take 1 Puff by inhalation every twelve (12) hours.  insulin aspart (NOVOLOG) 100 unit/mL injection by SubCUTAneous route.  rivaroxaban (XARELTO) 10 mg tablet Take 20 mg by mouth daily.  metoprolol tartrate (LOPRESSOR) 25 mg tablet Take 25 mg by mouth two (2) times a day.  losartan-hydrochlorothiazide (HYZAAR) 50-12.5 mg per tablet Take 1 Tab by mouth daily.  atorvastatin (LIPITOR) 20 mg tablet Take 20 mg by mouth daily.  nitroglycerin (NITROSTAT) 0.4 mg SL tablet 0.4 mg by SubLINGual route every five (5) minutes as needed for Chest Pain.  acetaminophen (TYLENOL) 325 mg tablet Take 325 mg by mouth every four (4) hours as needed for Pain.  cyclobenzaprine (FLEXERIL) 10 mg tablet Take 10 mg by mouth three (3) times daily as needed for Muscle Spasm(s).  albuterol (PROVENTIL HFA, VENTOLIN HFA, PROAIR HFA) 90 mcg/actuation inhaler Take 2 Puffs by inhalation.  terbinafine HCl (LAMISIL) 1 % topical cream Apply  to affected area two (2) times a day. 30 g 0    nicotine (NICODERM CQ) 7 mg/24 hr 1 Patch by TransDERmal route every twenty-four (24) hours.  oxyCODONE-acetaminophen (PERCOCET) 5-325 mg per tablet Take 1 Tab by mouth every four (4) hours as needed for Pain. Past History     Past Medical History:  Past Medical History:   Diagnosis Date    COPD     bronchitis    Diabetes ([facility] [address]        Past Surgical History:  Past Surgical History:   Procedure Laterality Date    HX CORONARY STENT PLACEMENT         Family History:  History reviewed. No pertinent family history. Social History:  Social History   Substance Use Topics    Smoking status: Current Every Day Smoker     Packs/day: 1.00    Smokeless tobacco: Never Used    Alcohol use No       Allergies:  No Known Allergies      Review of Systems   Review of Systems   Constitutional: Negative for chills and fever. HENT: Negative for congestion, rhinorrhea, sneezing and sore throat.     Eyes: Negative for redness and visual disturbance. Respiratory: Positive for cough. Negative for shortness of breath. Cardiovascular: Negative for chest pain and leg swelling. Gastrointestinal: Negative for abdominal pain, nausea and vomiting. Genitourinary: Negative for difficulty urinating and frequency. Musculoskeletal: Negative for back pain, myalgias and neck stiffness. Skin: Negative for rash. Neurological: Negative for dizziness, syncope, weakness and headaches. Hematological: Negative for adenopathy. All other systems reviewed and are negative. Physical Exam   Physical Exam   Constitutional: He is oriented to person, place, and time. He appears well-developed and well-nourished. No distress. Obese white male   HENT:   Head: Normocephalic and atraumatic. Right Ear: Tympanic membrane is injected (mild). Left Ear: Tympanic membrane is injected (mild). Mouth/Throat: Oropharynx is clear and moist and mucous membranes are normal.   Poor dentition. No cervical LAD. Eyes: EOM are normal.   Neck: Normal range of motion and full passive range of motion without pain. Neck supple. Cardiovascular: Normal rate, regular rhythm, normal heart sounds, intact distal pulses and normal pulses. No murmur heard. Pulmonary/Chest: Effort normal. No respiratory distress. He has wheezes (scant) in the right upper field. He exhibits no tenderness. Bronchospastic cough. Good air movement   Abdominal: Soft. Normal appearance and bowel sounds are normal. There is no tenderness. There is no rebound and no guarding. Musculoskeletal: He exhibits no edema (pitting peripheral). Neurological: He is alert and oriented to person, place, and time. He has normal strength. Skin: Skin is warm, dry and intact. No rash noted. No erythema. Psychiatric: He has a normal mood and affect.  His speech is normal and behavior is normal. Judgment and thought content normal.   Nursing note and vitals reviewed. Diagnostic Study Results     Labs -     Recent Results (from the past 12 hour(s))   EKG, 12 LEAD, INITIAL    Collection Time: 12/30/17  4:57 PM   Result Value Ref Range    Ventricular Rate 93 BPM    Atrial Rate 93 BPM    P-R Interval 148 ms    QRS Duration 88 ms    Q-T Interval 360 ms    QTC Calculation (Bezet) 447 ms    Calculated P Axis 53 degrees    Calculated R Axis 79 degrees    Calculated T Axis 60 degrees    Diagnosis       Normal sinus rhythm  Normal ECG  When compared with ECG of 06-JAN-2017 13:42,  No significant change was found     NT-PRO BNP    Collection Time: 12/30/17  5:01 PM   Result Value Ref Range    NT pro- (H) 0 - 125 PG/ML   CBC WITH AUTOMATED DIFF    Collection Time: 12/30/17  5:01 PM   Result Value Ref Range    WBC 6.7 4.1 - 11.1 K/uL    RBC 5.11 4.10 - 5.70 M/uL    HGB 15.7 12.1 - 17.0 g/dL    HCT 45.3 36.6 - 50.3 %    MCV 88.6 80.0 - 99.0 FL    MCH 30.7 26.0 - 34.0 PG    MCHC 34.7 30.0 - 36.5 g/dL    RDW 14.8 (H) 11.5 - 14.5 %    PLATELET 429 118 - 296 K/uL    NEUTROPHILS 65 32 - 75 %    LYMPHOCYTES 19 12 - 49 %    MONOCYTES 12 5 - 13 %    EOSINOPHILS 3 0 - 7 %    BASOPHILS 1 0 - 1 %    ABS. NEUTROPHILS 4.5 1.8 - 8.0 K/UL    ABS. LYMPHOCYTES 1.3 0.8 - 3.5 K/UL    ABS. MONOCYTES 0.8 0.0 - 1.0 K/UL    ABS. EOSINOPHILS 0.2 0.0 - 0.4 K/UL    ABS. BASOPHILS 0.0 0.0 - 0.1 K/UL   METABOLIC PANEL, COMPREHENSIVE    Collection Time: 12/30/17  5:01 PM   Result Value Ref Range    Sodium 137 136 - 145 mmol/L    Potassium 4.0 3.5 - 5.1 mmol/L    Chloride 100 97 - 108 mmol/L    CO2 28 21 - 32 mmol/L    Anion gap 9 5 - 15 mmol/L    Glucose 152 (H) 65 - 100 mg/dL    BUN 18 6 - 20 MG/DL    Creatinine 1.19 0.70 - 1.30 MG/DL    BUN/Creatinine ratio 15 12 - 20      GFR est AA >60 >60 ml/min/1.73m2    GFR est non-AA >60 >60 ml/min/1.73m2    Calcium 10.2 (H) 8.5 - 10.1 MG/DL    Bilirubin, total 0.6 0.2 - 1.0 MG/DL    ALT (SGPT) 38 12 - 78 U/L    AST (SGOT) 25 15 - 37 U/L    Alk. phosphatase 111 45 - 117 U/L    Protein, total 7.6 6.4 - 8.2 g/dL    Albumin 3.6 3.5 - 5.0 g/dL    Globulin 4.0 2.0 - 4.0 g/dL    A-G Ratio 0.9 (L) 1.1 - 2.2     CK W/ CKMB & INDEX    Collection Time: 12/30/17  5:01 PM   Result Value Ref Range    CK 73 39 - 308 U/L    CK - MB 2.3 <3.6 NG/ML    CK-MB Index 3.2 (H) 0.0 - 2.5     TROPONIN I    Collection Time: 12/30/17  5:01 PM   Result Value Ref Range    Troponin-I, Qt. <0.04 <0.05 ng/mL       Radiologic Studies -     CXR Results  (Last 48 hours)               12/30/17 1716  XR CHEST PORT Final result    Impression:  IMPRESSION: Normal chest.           Narrative:  EXAM:  XR CHEST PORT. INDICATION: Chest pain. COMPARISON: 1/6/2017. FINDINGS:    A portable AP radiograph of the chest was obtained at 1715 hours. Lines and tubes: None. Lungs: The lungs are clear. Pleura: There is no pneumothorax or pleural effusion. Mediastinum: The cardiac and mediastinal contours and pulmonary vascularity are   normal.   Bones and soft tissues: The bones and soft tissues are grossly within normal   limits. Medical Decision Making   I am the first provider for this patient. I reviewed the vital signs, available nursing notes, past medical history, past surgical history, family history and social history. Vital Signs-Reviewed the patient's vital signs. Patient Vitals for the past 12 hrs:   Temp Pulse Resp BP SpO2   12/30/17 1547 98 °F (36.7 °C) 96 20 133/60 96 %       Pulse Oximetry Analysis - 96% on room air    Cardiac Monitor:   Rate: 96 bpm  Rhythm: Normal Sinus Rhythm 133/60     Records Reviewed: Nursing Notes and Old Medical Records    Provider Notes (Medical Decision Making):   DDx: COPD exacerbation vs PNA vs CHF    ED Course:   Initial assessment performed. The patients presenting problems have been discussed, and they are in agreement with the care plan formulated and outlined with them.   I have encouraged them to ask questions as they arise throughout their visit. 5:47 PM  Pt has been re-evaluated and reports to be feeling better. Disposition:  DISCHARGE NOTE  5:47 PM  The patient has been re-evaluated and is ready for discharge. Reviewed available results with patient. Counseled patient on diagnosis and care plan. Patient has expressed understanding, and all questions have been answered. Patient agrees with plan and agrees to follow up as recommended, or return to the ED if their symptoms worsen. Discharge instructions have been provided and explained to the patient, along with reasons to return to the ED. PLAN:  1. Current Discharge Medication List      START taking these medications    Details   dextromethorphan-guaiFENesin (ROBITUSSIN-DM)  mg/5 mL syrup Take 10 mL by mouth every six (6) hours as needed for Cough. Qty: 240 mL, Refills: 0      methylPREDNISolone (MEDROL DOSEPACK) 4 mg tablet Sig as dir  Qty: 1 Dose Pack, Refills: 0         CONTINUE these medications which have CHANGED    Details   azithromycin (ZITHROMAX Z-EMILIANA) 250 mg tablet Take two tablets today then one tablet daily  Qty: 6 Tab, Refills: 0           2. Follow-up Information     Follow up With Details Comments [address] [facility] Schedule an appointment as soon as possible for a visit in 2 days  [address]  [phone]        Return to ED if worse     Diagnosis     Clinical Impression:   1. Acute exacerbation of chronic obstructive pulmonary disease (COPD) ([facility] [address]        Attestations: This note is prepared by Kalyn Chawla, acting as Scribe for Rita Medrano MD.    Rita Medrano MD: The scribe's documentation has been prepared under my direction and personally reviewed by me in its entirety. I confirm that the note above accurately reflects all work, treatment, procedures, and medical decision making performed by me.

## 2017-12-30 NOTE — DISCHARGE INSTRUCTIONS
COPD Exacerbation Plan: Care Instructions  Your Care Instructions    If you have chronic obstructive pulmonary disease (COPD), your usual shortness of breath could suddenly get worse. You may start coughing more and have more mucus. This flare-up is called a COPD exacerbation (say \"[id number]-[id number]-BAY-elvira\"). A lung infection or air pollution could set off an exacerbation. Sometimes it can happen after a quick change in temperature or being around chemicals. Work with your doctor to make a plan for dealing with an exacerbation. You can better manage it if you plan ahead. Follow-up care is a key part of your treatment and safety. Be sure to make and go to all appointments, and call your doctor if you are having problems. It's also a good idea to know your test results and keep a list of the medicines you take. How can you care for yourself at home? During an exacerbation  · Do not panic if you start to have one. Quick treatment at home may help you prevent serious breathing problems. If you have a COPD exacerbation plan that you developed with your doctor, follow it. · Take your medicines exactly as your doctor tells you. ¨ Use your inhaler as directed by your doctor. If your symptoms do not get better after you use your medicine, have someone take you to the emergency room. Call an ambulance if necessary. ¨ With inhaled medicines, a spacer or a nebulizer may help you get more medicine to your lungs. Ask your doctor or pharmacist how to use them properly. Practice using the spacer in front of a mirror before you have an exacerbation. This may help you get the medicine into your lungs quickly. ¨ If your doctor has given you steroid pills, take them as directed. ¨ Your doctor may have given you a prescription for antibiotics, which you can fill if you need it. ¨ Talk to your doctor if you have any problems with your medicine.  And call your doctor if you have to use your antibiotic or steroid pills.  Preventing an exacerbation  · Do not smoke. This is the most important step you can take to prevent more damage to your lungs and prevent problems. If you already smoke, it is never too late to stop. If you need help quitting, talk to your doctor about stop-smoking programs and medicines. These can increase your chances of quitting for good. · Take your daily medicines as prescribed. · Avoid colds and flu. ¨ Get a pneumococcal vaccine. ¨ Get a flu vaccine each year, as soon as it is available. Ask those you live or work with to do the same, so they will not get the flu and infect you. ¨ Try to stay away from people with colds or the flu. ¨ Wash your hands often. · Avoid secondhand smoke; air pollution; cold, dry air; hot, humid air; and high altitudes. Stay at home with your windows closed when air pollution is bad. · Learn breathing techniques for COPD, such as breathing through pursed lips. These techniques can help you breathe easier during an exacerbation. When should you call for help? Call 911 anytime you think you may need emergency care. For example, call if:  ? · You have severe trouble breathing. ? · You have severe chest pain. ?Call your doctor now or seek immediate medical care if:  ? · You have new or worse shortness of breath. ? · You develop new chest pain. ? · You are coughing more deeply or more often, especially if you notice more mucus or a change in the color of your mucus. ? · You cough up blood. ? · You have new or increased swelling in your legs or belly. ? · You have a fever. ? Watch closely for changes in your health, and be sure to contact your doctor if:  ? · You need to use your antibiotic or steroid pills. ? · Your symptoms are getting worse. Where can you learn more? Go to http://dank-damien.info/. Enter X047 in the search box to learn more about \"COPD Exacerbation Plan: Care Instructions. \"  Current as of:  May 12, 2017  Content Version: 11.4  © 3836-7555 [employer]. Care instructions adapted under license by [employer] (which disclaims liability or warranty for this information). If you have questions about a medical condition or this instruction, always ask your healthcare professional. [address] any warranty or liability for your use of this information.

## 2017-12-30 NOTE — ED NOTES
Emergency Department Nursing Plan of Care       The Nursing Plan of Care is developed from the Nursing assessment and Emergency Department Attending provider initial evaluation. The plan of care may be reviewed in the ED Provider note.     The Plan of Care was developed with the following considerations:   Patient / Family readiness to learn indicated by:verbalized understanding  Persons(s) to be included in education: patient  Barriers to Learning/Limitations:No    Signed     Jefferson Linda RN    12/30/2017   4:15 PM

## 2017-12-30 NOTE — ED NOTES
Patient (s)  given copy of dc instructions and 3 paper script(s) and 0 electronic scripts. Patient (s)  verbalized understanding of instructions and script (s). Patient given a current medication reconciliation form and verbalized understanding of their medications. Patient (s) verbalized understanding of the importance of discussing medications with  his or her physician or clinic they will be following up with. Patient alert and oriented and in no acute distress. Patient offered wheelchair from treatment area to hospital entrance, patient declined wheelchair.

## 2017-12-31 LAB
ATRIAL RATE: 93 BPM
CALCULATED P AXIS, ECG09: 53 DEGREES
CALCULATED R AXIS, ECG10: 79 DEGREES
CALCULATED T AXIS, ECG11: 60 DEGREES
DIAGNOSIS, 93000: NORMAL
P-R INTERVAL, ECG05: 148 MS
Q-T INTERVAL, ECG07: 360 MS
QRS DURATION, ECG06: 88 MS
QTC CALCULATION (BEZET), ECG08: 447 MS
VENTRICULAR RATE, ECG03: 93 BPM

## 2018-01-02 NOTE — PROGRESS NOTES
CM reviewed chart. CM did ED follow-up. CM scheduled patient a PCP appointment for 1/9/18 at 8:15 am at [facility]. CM also schedule patient a cardiology appointment with Dr. Mely Esteban on 1/8/18 at 2:15 pm. Patient was asked to bring copy of medication list, photo I.D., and insurance card. CM called patient to make him aware of his scheduled appointments, but patient did not answer. CM will send a letter in the mail informing patient of appointments.      [facility] Ekaterina MSW, QUOCHP

## 2018-01-08 ENCOUNTER — OFFICE VISIT (OUTPATIENT)
Dept: CARDIOLOGY CLINIC | Age: 68
End: 2018-01-08

## 2018-01-08 DIAGNOSIS — R07.89 CHEST DISCOMFORT: Primary | ICD-10-CM

## 2018-01-08 RX ORDER — MULTIVIT WITH MINERALS/LUTEIN
TABLET ORAL
Refills: 0 | COMMUNITY
Start: 2017-12-31 | End: 2018-01-08 | Stop reason: SDUPTHER

## 2018-01-08 RX ORDER — DIPHENHYDRAMINE HYDROCHLORIDE 25 MG/1
CAPSULE ORAL
Refills: 0 | COMMUNITY
Start: 2017-12-10 | End: 2018-01-12

## 2018-01-12 ENCOUNTER — HOSPITAL ENCOUNTER (OUTPATIENT)
Dept: GENERAL RADIOLOGY | Age: 68
Discharge: HOME | End: 2018-01-12
Payer: MEDICARE

## 2018-01-12 ENCOUNTER — OFFICE VISIT (OUTPATIENT)
Dept: INTERNAL MEDICINE CLINIC | Age: 68
End: 2018-01-12

## 2018-01-12 VITALS
TEMPERATURE: 97.9 F | SYSTOLIC BLOOD PRESSURE: 114 MMHG | HEIGHT: 69 IN | DIASTOLIC BLOOD PRESSURE: 63 MMHG | BODY MASS INDEX: 32.98 KG/M2 | WEIGHT: 222.7 LBS | RESPIRATION RATE: 18 BRPM | OXYGEN SATURATION: 93 % | HEART RATE: 85 BPM

## 2018-01-12 DIAGNOSIS — J44.1 COPD EXACERBATION (HCC): ICD-10-CM

## 2018-01-12 DIAGNOSIS — E11.9 CONTROLLED TYPE 2 DIABETES MELLITUS WITHOUT COMPLICATION, WITHOUT LONG-TERM CURRENT USE OF INSULIN (HCC): Primary | ICD-10-CM

## 2018-01-12 DIAGNOSIS — Z11.59 SCREENING FOR VIRAL DISEASE: ICD-10-CM

## 2018-01-12 DIAGNOSIS — Z12.11 COLON CANCER SCREENING: ICD-10-CM

## 2018-01-12 DIAGNOSIS — J01.00 ACUTE NON-RECURRENT MAXILLARY SINUSITIS: ICD-10-CM

## 2018-01-12 DIAGNOSIS — I25.10 CORONARY ARTERY DISEASE INVOLVING NATIVE HEART WITHOUT ANGINA PECTORIS, UNSPECIFIED VESSEL OR LESION TYPE: ICD-10-CM

## 2018-01-12 PROBLEM — J44.0 CHRONIC OBSTRUCTIVE PULMONARY DISEASE WITH ACUTE LOWER RESPIRATORY INFECTION (HCC): Status: ACTIVE | Noted: 2018-01-12

## 2018-01-12 PROBLEM — F17.200 SMOKER: Status: ACTIVE | Noted: 2018-01-12

## 2018-01-12 LAB — HBA1C MFR BLD HPLC: 6 %

## 2018-01-12 PROCEDURE — 71046 X-RAY EXAM CHEST 2 VIEWS: CPT

## 2018-01-12 RX ORDER — AMOXICILLIN AND CLAVULANATE POTASSIUM 875; 125 MG/1; MG/1
1 TABLET, FILM COATED ORAL 2 TIMES DAILY
Qty: 14 TAB | Refills: 0 | Status: SHIPPED | OUTPATIENT
Start: 2018-01-12 | End: 2018-01-19

## 2018-01-12 RX ORDER — FLUTICASONE PROPIONATE 50 MCG
2 SPRAY, SUSPENSION (ML) NASAL DAILY
Qty: 1 BOTTLE | Refills: 0 | Status: SHIPPED | OUTPATIENT
Start: 2018-01-12 | End: 2019-06-10

## 2018-01-12 RX ORDER — LANCETS
EACH MISCELLANEOUS
Qty: 100 EACH | Refills: 11 | Status: SHIPPED | OUTPATIENT
Start: 2018-01-12

## 2018-01-12 NOTE — Clinical Note
New patient to us. .. His beast friend Earnestine Lamas is asking if he is eligible for any type of assistance at home - an aide to assist, him. Don't think he needs one but she wants to know. .. Sharla Swift

## 2018-01-12 NOTE — PROGRESS NOTES
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 year old male who presents to establish care. He moves back and forth between [place] and [place] and did have care in [place]. He presents with his best friend Karlo Vallejo. He has been to the [address] in the past but had a \"problem\" there and does not want to return. In [place], he was established with:    Cardiology (hx of 2 cardiac stents - on xarelto)  Endocrinology (DM2)  Neurology: vague hx of episodes where he just \"passed out\" - states that nobody was able to come to a diagnosis? No episodes in >1 year. This was in 2014. Diabetic Review of Systems - medication compliance: taking jardiance and metformin - reports adherence with this. Was on 70/30 insulin in the past but stopped 1 month ago d/t low sugars although he states that he does not regularly check his sugars now, diabetic diet compliance: probably noncompliant though I cannot elicit that specific history, home glucose monitoring: is not performed. COPD: smokes 1 ppd - states he can't quit - has even tried chantix. He states that he is not taking advair or spiriva bc he has not felt the need to. Has only been needing rescue inhaler once every few months. Cough: he currently reports cough x 3 weeks. Cough is productive with yellow phlegm. He has not checked temp but has felt hot. He has associated sinus pressure/congestion x 2 weeks - reports facial pain. No chest pain. No leg swelling. He was in ED a few weeks ago was rx'd zpak, pred which he says he took but is not better - feels worse. He is trying multiple otc meds        Reviewed PmHx, RxHx, FmHx, SocHx, AllgHx and updated in chart.   Family History   Problem Relation Age of Onset    Cancer Mother      breast    Heart Attack Mother        Past Medical History:   Diagnosis Date    COPD     bronchitis    Diabetes [facility])       Social History     Social History    Marital status: SINGLE     Spouse name: N/A    Number of children: N/A    Years of education: N/A     Social History Main Topics    Smoking status: Current Every Day Smoker     Packs/day: 1.00    Smokeless tobacco: Never Used      Comment: less than  1 pack per day    Alcohol use No      Comment: stopped 1994    Drug use: No    Sexual activity: Not Currently     Other Topics Concern    None     Social History Narrative    12/1/18: goes back and forth from here and [place], plans on being in [place] for a while. Current Outpatient Prescriptions   Medication Sig    empagliflozin (JARDIANCE) 25 mg tablet Take  by mouth daily.  amoxicillin-clavulanate (AUGMENTIN) 875-125 mg per tablet Take 1 Tab by mouth two (2) times a day for 7 days.  fluticasone (FLONASE) 50 mcg/actuation nasal spray 2 Sprays by Both Nostrils route daily.  BLOOD-GLUCOSE METER (FREESTYLE LITE METER) by Does Not Apply route.  glucose blood VI test strips (ASCENSIA AUTODISC VI, ONE TOUCH ULTRA TEST VI) strip Freestyle. Check sugar once daily fasting. E11.65    Lancets misc Use as directed. Dx: check sugar once daily. E11.65 freestyle    metFORMIN (GLUCOPHAGE) 500 mg tablet Take  by mouth two (2) times daily (with meals).  aspirin delayed-release 81 mg tablet Take  by mouth daily.  rivaroxaban (XARELTO) 10 mg tablet Take 20 mg by mouth daily.  metoprolol tartrate (LOPRESSOR) 25 mg tablet Take 25 mg by mouth two (2) times a day.  atorvastatin (LIPITOR) 20 mg tablet Take 20 mg by mouth daily.  albuterol (PROVENTIL HFA, VENTOLIN HFA, PROAIR HFA) 90 mcg/actuation inhaler Take 2 Puffs by inhalation.  tiotropium (SPIRIVA) 18 mcg inhalation capsule Take 1 Cap by inhalation daily.  fluticasone-salmeterol (ADVAIR) 100-50 mcg/dose diskus inhaler Take 1 Puff by inhalation every twelve (12) hours.     nitroglycerin (NITROSTAT) 0.4 mg SL tablet 0.4 mg by SubLINGual route every five (5) minutes as needed for Chest Pain.  acetaminophen (TYLENOL) 325 mg tablet Take 325 mg by mouth every four (4) hours as needed for Pain. No current facility-administered medications for this visit. Review of Systems:   Constitutional:    Negative for fever and chills, negative diaphoresis. HEENT:              Negative for neck pain and stiffness. Eyes:                  Negative for visual disturbance, itching, redness or discharge. Respiratory:       + for cough and shortness of breath. Cardiovascular:  Negative for chest pain and palpitations. Gastrointestinal: Negative for nausea, vomiting, abdominal pain, diarrhea or constipation. Genitourinary:     Negative for dysuria and frequency. Musculoskeletal: Negative for falls, tenderness and swelling. Skin:                    Negative for rash, masses or lesions. Neurological:       Negative for dizzyness, seizure, loss of consciousness, weakness and numbness.      Objective:     Vitals:    01/12/18 1418   BP: 114/63   Pulse: 85   Resp: 18   Temp: 97.9 °F (36.6 °C)   TempSrc: Oral   SpO2: 93%   Weight: 222 lb 11.2 oz (101 kg)   Height: 5' 9\" (1.753 m)       Results for orders placed or performed in visit on 01/12/18   AMB POC HEMOGLOBIN A1C   Result Value Ref Range    Hemoglobin A1c (POC) 6.0 %         Physical Examination: General appearance - alert, well appearing, and in no distress and overweight  Mental status - alert, oriented to person, place, and time  Eyes - pupils equal and reactive, extraocular eye movements intact  Ears - bilateral TM's and external ear canals normal  Nose - mucosal congestion, mucosal erythema and sinus tenderness noted maxillary  Mouth - mucous membranes moist, pharynx normal without lesions  Chest - rhonchi noted bases  Heart - normal rate, regular rhythm, normal S1, S2, no murmurs, rubs, clicks or gallops  Extremities - No lower extremity edema  Skin: multiple scabs - states he slept somewhere with bedbugs - no s/s of secondary infxn      Assessment/ Plan:   Follow-up Disposition:  Return in about 3 months (around 4/12/2018), or if symptoms worsen or fail to improve. 1. Controlled type 2 diabetes mellitus without complication, without long-term current use of insulin (HCC)    - AMB POC HEMOGLOBIN A1C  - empagliflozin (JARDIANCE) 25 mg tablet; Take  by mouth daily.  - LIPID PANEL  - AMB POC URINE, MICROALBUMIN, SEMIQUANT (3 RESULTS)    2. Screening for viral disease    - HEPATITIS C AB    3. Coronary artery disease involving native heart without angina pectoris, unspecified vessel or lesion type    - REFERRAL TO CARDIOLOGY    4. Colon cancer screening    - REFERRAL TO GASTROENTEROLOGY    5. COPD exacerbation (HCC)    - XR CHEST PA LAT; Future  - amoxicillin-clavulanate (AUGMENTIN) 875-125 mg per tablet; Take 1 Tab by mouth two (2) times a day for 7 days. Dispense: 14 Tab; Refill: 0    6. Acute non-recurrent maxillary sinusitis    - fluticasone (FLONASE) 50 mcg/actuation nasal spray; 2 Sprays by Both Nostrils route daily. Dispense: 1 Bottle; Refill: 0        I have discussed the diagnosis with the patient and the intended plan as seen in the above orders. The patient has received an after-visit summary and questions were answered concerning future plans. Pt conveyed understanding of plan. Medication Side Effects and Warnings were discussed with patient: yes  Patient Labs were reviewed: yes  Patient Past Records were reviewed:  yes    John Kelly.  Dewayne Grimes NP

## 2018-01-12 NOTE — MR AVS SNAPSHOT
Visit Information Date & Time Provider Department Dept. Phone Encounter #  
 1/12/2018  3:00 PM Marilou Saunders [facility], [address] [id number] Follow-up Instructions Return in about 3 months (around 4/12/2018), or if symptoms worsen or fail to improve. Upcoming Health Maintenance Date Due Hepatitis C Screening 1950 FOBT Q 1 YEAR AGE 50-75 2/15/2000 ZOSTER VACCINE AGE 60> 12/15/2009 DTaP/Tdap/Td series (1 - Tdap) 9/20/2014 GLAUCOMA SCREENING Q2Y 2/15/2015 Pneumococcal 65+ Low/Medium Risk (1 of 2 - PCV13) 2/15/2015 MEDICARE YEARLY EXAM 2/15/2015 Influenza Age 5 to Adult 8/1/2017 Allergies as of 1/12/2018  Review Complete On: 1/12/2018 By: Cholo Wilhelm LPN No Known Allergies Current Immunizations  Never Reviewed Name Date Td, Adsorbed PF 9/19/2014  6:18 PM  
  
 Not reviewed this visit You Were Diagnosed With   
  
 Codes Comments Controlled type 2 diabetes mellitus without complication, without long-term current use of insulin (RUST 75.)    -  Primary ICD-10-CM: E11.9 ICD-9-CM: 250.00 Screening for viral disease     ICD-10-CM: Z11.59 
ICD-9-CM: V73.99 Coronary artery disease involving native heart without angina pectoris, unspecified vessel or lesion type     ICD-10-CM: I25.10 ICD-9-CM: 414.01 Colon cancer screening     ICD-10-CM: Z12.11 ICD-9-CM: V76.51   
 COPD exacerbation (RUST 75.)     ICD-10-CM: J44.1 ICD-9-CM: 491.21 Acute non-recurrent maxillary sinusitis     ICD-10-CM: J01.00 ICD-9-CM: 461.0 Vitals BP Pulse Temp Resp Height(growth percentile) Weight(growth percentile) 114/63 (BP 1 Location: Left arm, BP Patient Position: Sitting) 85 97.9 °F (36.6 °C) (Oral) 18 5' 9\" (1.753 m) 222 lb 11.2 oz (101 kg) SpO2 BMI Smoking Status 93% 32.89 kg/m2 Current Every Day Smoker BMI and BSA Data  Body Mass Index Body Surface Area  
 32.89 kg/m 2 2.22 m 2  
  
 Preferred Pharmacy Pharmacy Name Phone [facility] [facility] [phone] Your Updated Medication List  
  
   
This list is accurate as of: 1/12/18  3:11 PM.  Always use your most recent med list.  
  
  
  
  
 acetaminophen 325 mg tablet Commonly known as:  TYLENOL Take 325 mg by mouth every four (4) hours as needed for Pain. albuterol 90 mcg/actuation inhaler Commonly known as:  PROVENTIL HFA, VENTOLIN HFA, PROAIR HFA Take 2 Puffs by inhalation. amoxicillin-clavulanate 875-125 mg per tablet Commonly known as:  AUGMENTIN Take 1 Tab by mouth two (2) times a day for 7 days. aspirin delayed-release 81 mg tablet Take  by mouth daily. atorvastatin 20 mg tablet Commonly known as:  LIPITOR Take 20 mg by mouth daily. fluticasone 50 mcg/actuation nasal spray Commonly known as:  Itz Lover 2 Sprays by Both Nostrils route daily. fluticasone-salmeterol 100-50 mcg/dose diskus inhaler Commonly known as:  ADVAIR Take 1 Puff by inhalation every twelve (12) hours. JARDIANCE 25 mg tablet Generic drug:  empagliflozin Take  by mouth daily. metFORMIN 500 mg tablet Commonly known as:  GLUCOPHAGE Take  by mouth two (2) times daily (with meals). metoprolol tartrate 25 mg tablet Commonly known as:  LOPRESSOR Take 25 mg by mouth two (2) times a day. nitroglycerin 0.4 mg SL tablet Commonly known as:  NITROSTAT  
0.4 mg by SubLINGual route every five (5) minutes as needed for Chest Pain.  
  
 rivaroxaban 10 mg tablet Commonly known as:  Beth Carton Take 20 mg by mouth daily. tiotropium 18 mcg inhalation capsule Commonly known as:  Leia Ear Take 1 Cap by inhalation daily. Prescriptions Sent to Pharmacy  Refills  
 amoxicillin-clavulanate (AUGMENTIN) 875-125 mg per tablet 0  
 Sig: Take 1 Tab by mouth two (2) times a day for 7 days. Class: Normal  
 Pharmacy: [facility] [address] [place] [address] [place] [address] Ph #: [phone] Route: Oral  
 fluticasone (FLONASE) 50 mcg/actuation nasal spray 0 Si Sprays by Both Nostrils route daily. Class: Normal  
 Pharmacy: [facility] [address] [place] [address] [place] [address] Ph #: [phone] Route: Both Nostrils We Performed the Following AMB POC HEMOGLOBIN A1C [51611 CPT(R)] HEPATITIS C AB [79681 CPT(R)] LIPID PANEL [33158 CPT(R)] REFERRAL TO CARDIOLOGY [DRN27 Custom] Comments:  
 Please evaluate patient for est care cad REFERRAL TO GASTROENTEROLOGY [EKY59 Custom] Comments:  
 Please evaluate patient for colon cancer screening Follow-up Instructions Return in about 3 months (around 2018), or if symptoms worsen or fail to improve. To-Do List   
 2018 Imaging:  XR CHEST PA LAT Referral Information Referral ID Referred By Referred To  
  
 [id number] [facility] [address] Phone: [phone] Fax: [phone] Visits Status Start Date End Date 1 New Request 18 If your referral has a status of pending review or denied, additional information will be sent to support the outcome of this decision. Referral ID Referred By Referred To  
 [id number] Brandon Hatfield MD  
   [address] [facility] [address] [place] [address] Drive, [address] Phone: [phone] Fax: [phone] Visits Status Start Date End Date 1 New Request 18 If your referral has a status of pending review or denied, additional information will be sent to support the outcome of this decision. [facility]! [facility] introduces [employer]itar patient portal. Now you can access parts of your medical record, email your doctor's office, and request medication refills online. 1. In your internet browser, go to [email or web address] 2. Click on the First Time User? Click Here link in the Sign In box. You will see the New Member Sign Up page. 3. Enter your [employer]itar Access Code exactly as it appears below. You will not need to use this code after youve completed the sign-up process. If you do not sign up before the expiration date, you must request a new code. · [employer] Access Code: [email or web address] Expires: 3/10/2018  2:32 AM 
 
4. Enter the last four digits of your Social Security Number (xxxx) and Date of Birth (mm/dd/yyyy) as indicated and click Submit. You will be taken to the next sign-up page. 5. Create a Agitar ID. This will be your Agitar login ID and cannot be changed, so think of one that is secure and easy to remember. 6. Create a [employer]itar password. You can change your password at any time. 7. Enter your Password Reset Question and Answer. This can be used at a later time if you forget your password. 8. Enter your e-mail address. You will receive e-mail notification when new information is available in [address] 9. Click Sign Up. You can now view and download portions of your medical record. 10. Click the Download Summary menu link to download a portable copy of your medical information. If you have questions, please visit the Frequently Asked Questions section of the [employer] website. Remember, [employer]itar is NOT to be used for urgent needs. For medical emergencies, dial 911. Now available from your iPhone and Android! Please provide this summary of care documentation to your next provider. Your primary care clinician is listed as Rivera Valencia. If you have any questions after today's visit, please call [phone].

## 2018-01-12 NOTE — PROGRESS NOTES
Pt here for   Chief Complaint   Patient presents with    Establish Care    Cold Symptoms     couging    Breathing Problem     1. Have you been to the ER, urgent care clinic since your last visit? Hospitalized since your last visit? Yes When: [facility] 12-10-17    2. Have you seen or consulted any other health care providers outside of the [address] [place] [address] since your last visit? Include any pap smears or colon screening.  No       Pt denies pain at this time      PHQ over the last two weeks 1/12/2018   Little interest or pleasure in doing things Not at all   Feeling down, depressed or hopeless Not at all   Total Score PHQ 2 0

## 2018-01-13 LAB
CHOLEST SERPL-MCNC: 113 MG/DL (ref 100–199)
HCV AB S/CO SERPL IA: 0.1 S/CO RATIO (ref 0–0.9)
HDLC SERPL-MCNC: 27 MG/DL
INTERPRETATION, 910389: NORMAL
LDLC SERPL CALC-MCNC: 36 MG/DL (ref 0–99)
TRIGL SERPL-MCNC: 250 MG/DL (ref 0–149)
VLDLC SERPL CALC-MCNC: 50 MG/DL (ref 5–40)

## 2018-01-15 PROBLEM — E78.1 HYPERTRIGLYCERIDEMIA: Status: ACTIVE | Noted: 2018-01-15

## 2018-02-02 ENCOUNTER — OFFICE VISIT (OUTPATIENT)
Dept: CARDIOLOGY CLINIC | Age: 68
End: 2018-02-02

## 2018-02-02 VITALS
SYSTOLIC BLOOD PRESSURE: 143 MMHG | HEIGHT: 69 IN | DIASTOLIC BLOOD PRESSURE: 75 MMHG | WEIGHT: 217 LBS | BODY MASS INDEX: 32.14 KG/M2 | HEART RATE: 78 BPM

## 2018-02-02 DIAGNOSIS — E78.1 HYPERTRIGLYCERIDEMIA: Primary | ICD-10-CM

## 2018-02-02 DIAGNOSIS — R55 VASOVAGAL SYNCOPE: ICD-10-CM

## 2018-02-02 DIAGNOSIS — D68.9 BLOOD CLOTTING DISORDER (HCC): ICD-10-CM

## 2018-02-02 DIAGNOSIS — G47.30 SLEEP APNEA, UNSPECIFIED TYPE: ICD-10-CM

## 2018-02-02 NOTE — PROGRESS NOTES
Chief Complaint   Patient presents with    New Patient     pt c/o dizziness, mucus production, cough, synocope     1. Have you been to the ER, urgent care clinic since your last visit? Hospitalized since your last visit? Yes When: 12/30/18, COPD, [facility]    2. Have you seen or consulted any other health care providers outside of the [address] [place] [address] since your last visit? Include any pap smears or colon screening.  No

## 2018-02-02 NOTE — Clinical Note
Thank you for this referral.  Very challenging patient. His syncopal episodes sound more like vasomotor events than like a neurologic event. He might eventually benefit from tilt table testing. For now I updated his echo to consider both obstructive and concentric hypertrophy and to look at regional wall motion as well as pulmonary artery pressure. There is very little detail in his prior echo reports. Thank you.  Janel Paul

## 2018-02-02 NOTE — PATIENT INSTRUCTIONS
I have ordered an echocardiogram to examine many different things about your heart.  I will need the records from the [facility].
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
## 2018-02-02 NOTE — MR AVS SNAPSHOT
[address] [place] [address] 
 
 
 [address] [place] [address] 
[phone] Patient: Luc Larson MRN: [id number] Visit Information Date & Time Provider Department Dept. Phone Encounter #  
 2/2/2018 10:15 AM Liliya Valentino MD [facility]  Your Appointments 4/13/2018  1:00 PM  
ROUTINE CARE with Marilou Holguin NP  
[address] [facility] Appt Note: 3 month follow up [place] [address] [id number]  
[phone]  
  
   
 [place] [address] [place] [address] Upcoming Health Maintenance Date Due  
 FOOT EXAM Q1 2/15/1960 MICROALBUMIN Q1 2/15/1960 EYE EXAM RETINAL OR DILATED Q1 2/15/1960 FOBT Q 1 YEAR AGE 50-75 2/15/2000 ZOSTER VACCINE AGE 60> 12/15/2009 DTaP/Tdap/Td series (1 - Tdap) 9/20/2014 GLAUCOMA SCREENING Q2Y 2/15/2015 Pneumococcal 65+ Low/Medium Risk (1 of 2 - PCV13) 2/15/2015 MEDICARE YEARLY EXAM 2/15/2015 Influenza Age 5 to Adult 8/1/2017 HEMOGLOBIN A1C Q6M 7/12/2018 LIPID PANEL Q1 1/12/2019 Allergies as of 2/2/2018  Review Complete On: 2/2/2018 By: Олег Funez LPN No Known Allergies Current Immunizations  Never Reviewed Name Date Td, Adsorbed PF 9/19/2014  6:18 PM  
  
 Not reviewed this visit You Were Diagnosed With   
  
 Codes Comments Hypertriglyceridemia    -  Primary ICD-10-CM: E78.1 ICD-9-CM: 272.1 Blood clotting disorder (HCC)     ICD-10-CM: Z82.9 ICD-9-CM: 286. 9 Vasovagal syncope     ICD-10-CM: R55 
ICD-9-CM: 780.2 Sleep apnea, unspecified type     ICD-10-CM: G47.30 ICD-9-CM: 780.57 Vitals BP Pulse Height(growth percentile) Weight(growth percentile) BMI Smoking Status  143/75 (BP 1 Location: Right arm, BP Patient Position: Sitting) 78 5' 9\" (1.753 m) 217 lb (98.4 kg) 32.05 kg/m2 Current Every Day Smoker Vitals History BMI and BSA Data Body Mass Index Body Surface Area 32.05 kg/m 2 2.19 m 2 Preferred Pharmacy Pharmacy Name Phone [facility] [address] [facility] [phone] Your Updated Medication List  
  
   
This list is accurate as of: 2/2/18 11:37 AM.  Always use your most recent med list.  
  
  
  
  
 acetaminophen 325 mg tablet Commonly known as:  TYLENOL Take 325 mg by mouth every four (4) hours as needed for Pain. albuterol 90 mcg/actuation inhaler Commonly known as:  PROVENTIL HFA, VENTOLIN HFA, PROAIR HFA Take 2 Puffs by inhalation. aspirin delayed-release 81 mg tablet Take  by mouth daily. atorvastatin 20 mg tablet Commonly known as:  LIPITOR Take 20 mg by mouth daily. fluticasone 50 mcg/actuation nasal spray Commonly known as:  Susan Medin 2 Sprays by Both Nostrils route daily. fluticasone-salmeterol 100-50 mcg/dose diskus inhaler Commonly known as:  ADVAIR Take 1 Puff by inhalation every twelve (12) hours. FREESTYLE LITE METER  
by Does Not Apply route. glucose blood VI test strips strip Commonly known as:  ASCENSIA AUTODISC VI, ONE TOUCH ULTRA TEST VI Freestyle. Check sugar once daily fasting. E11.65 JARDIANCE 25 mg tablet Generic drug:  empagliflozin Take  by mouth daily. Lancets Misc Use as directed. Dx: check sugar once daily. E11.65 freestyle  
  
 metFORMIN 500 mg tablet Commonly known as:  GLUCOPHAGE Take  by mouth two (2) times daily (with meals). metoprolol tartrate 25 mg tablet Commonly known as:  LOPRESSOR Take 25 mg by mouth two (2) times a day. nitroglycerin 0.4 mg SL tablet Commonly known as:  NITROSTAT  
0.4 mg by SubLINGual route every five (5) minutes as needed for Chest Pain.  
  
 rivaroxaban 10 mg tablet Commonly known as:  Elaine Pillar Take 2 Tabs by mouth daily. tiotropium 18 mcg inhalation capsule Commonly known as:  Ramírez Pattent Take 1 Cap by inhalation daily. Prescriptions Sent to Pharmacy Refills  
 rivaroxaban (XARELTO) 10 mg tablet 12 Sig: Take 2 Tabs by mouth daily. Class: Normal  
 Pharmacy: [address] [facility] [address] [facility] Ph #: [phone] Route: Oral  
  
We Performed the Following AMB POC EKG ROUTINE W/ 12 LEADS, INTER & REP [29496 CPT(R)] To-Do List   
 Around 02/02/2018 ECHO:  2D ECHO COMPLETE ADULT (TTE) W OR WO CONTR Patient Instructions I have ordered an echocardiogram to examine many different things about your heart. I will need the records from the [facility].  
 
 
  
Introducing [facility]! Ranjan Zabala introduces [employer] patient portal. Now you can access parts of your medical record, email your doctor's office, and request medication refills online. 1. In your internet browser, go to [email or web address] 2. Click on the First Time User? Click Here link in the Sign In box. You will see the New Member Sign Up page. 3. Enter your [employer] Access Code exactly as it appears below. You will not need to use this code after youve completed the sign-up process. If you do not sign up before the expiration date, you must request a new code. · [employer] Access Code: [email or web address] Expires: 3/10/2018  2:32 AM 
 
4. Enter the last four digits of your Social Security Number (xxxx) and Date of Birth (mm/dd/yyyy) as indicated and click Submit. You will be taken to the next sign-up page. 5. Create a [employer] ID. This will be your [employer] login ID and cannot be changed, so think of one that is secure and easy to remember. 6. Create a [employer] password. You can change your password at any time. 7. Enter your Password Reset Question and Answer. This can be used at a later time if you forget your password. 8. Enter your e-mail address. You will receive e-mail notification when new information is available in [address] 9. Click Sign Up. You can now view and download portions of your medical record. 10. Click the Download Summary menu link to download a portable copy of your medical information. If you have questions, please visit the Frequently Asked Questions section of the [employer] website. Remember, [employer] is NOT to be used for urgent needs. For medical emergencies, dial 911. Now available from your iPhone and Android! Please provide this summary of care documentation to your next provider. Your primary care clinician is listed as Susana Billings. If you have any questions after today's visit, please call [phone].

## 2018-02-27 DIAGNOSIS — E11.9 CONTROLLED TYPE 2 DIABETES MELLITUS WITHOUT COMPLICATION, WITHOUT LONG-TERM CURRENT USE OF INSULIN (HCC): ICD-10-CM

## 2018-02-27 DIAGNOSIS — D68.9 BLOOD CLOTTING DISORDER (HCC): ICD-10-CM

## 2018-02-27 RX ORDER — NITROGLYCERIN 0.4 MG/1
0.4 TABLET SUBLINGUAL
Qty: 15 TAB | Refills: 0 | Status: SHIPPED | OUTPATIENT
Start: 2018-02-27

## 2018-02-27 RX ORDER — METFORMIN HYDROCHLORIDE 500 MG/1
500 TABLET ORAL 2 TIMES DAILY WITH MEALS
Qty: 180 TAB | Refills: 0 | Status: SHIPPED | OUTPATIENT
Start: 2018-02-27 | End: 2018-11-29

## 2018-02-27 RX ORDER — ATORVASTATIN CALCIUM 20 MG/1
20 TABLET, FILM COATED ORAL
Qty: 90 TAB | Refills: 2 | OUTPATIENT
Start: 2018-02-27

## 2018-02-27 RX ORDER — ACETAMINOPHEN 325 MG/1
325 TABLET ORAL
Qty: 90 TAB | Refills: 0 | Status: SHIPPED | OUTPATIENT
Start: 2018-02-27 | End: 2018-11-29

## 2018-02-27 RX ORDER — NITROGLYCERIN 0.4 MG/1
0.4 TABLET SUBLINGUAL
OUTPATIENT
Start: 2018-02-27

## 2018-02-27 RX ORDER — ACETAMINOPHEN 325 MG/1
325 TABLET ORAL
Qty: 90 TAB | Refills: 0 | OUTPATIENT
Start: 2018-02-27

## 2018-02-27 RX ORDER — ATORVASTATIN CALCIUM 20 MG/1
20 TABLET, FILM COATED ORAL
Qty: 90 TAB | Refills: 0 | Status: SHIPPED | OUTPATIENT
Start: 2018-02-27 | End: 2019-06-10 | Stop reason: SDUPTHER

## 2018-02-27 RX ORDER — METFORMIN HYDROCHLORIDE 500 MG/1
500 TABLET ORAL 2 TIMES DAILY WITH MEALS
Qty: 180 TAB | Refills: 2 | OUTPATIENT
Start: 2018-02-27

## 2018-02-27 RX ORDER — METOPROLOL TARTRATE 25 MG/1
25 TABLET, FILM COATED ORAL 2 TIMES DAILY
OUTPATIENT
Start: 2018-02-27

## 2018-03-02 DIAGNOSIS — D68.9 BLOOD CLOTTING DISORDER (HCC): ICD-10-CM

## 2018-03-02 DIAGNOSIS — D68.9 BLOOD CLOTTING DISORDER (HCC): Primary | ICD-10-CM

## 2018-03-09 ENCOUNTER — TELEPHONE (OUTPATIENT)
Dept: INTERNAL MEDICINE CLINIC | Age: 68
End: 2018-03-09

## 2018-03-09 NOTE — TELEPHONE ENCOUNTER
Patient's friend Jesus Manuel Austin called and stated that the patient was to have an aid come and assist the patient in the home. She has not heard back from [facility] and would like to know the status of the patient getting an aide.  The contact number is

## 2018-03-19 ENCOUNTER — HOSPITAL ENCOUNTER (EMERGENCY)
Age: 68
Discharge: HOME | End: 2018-03-19
Attending: EMERGENCY MEDICINE | Admitting: EMERGENCY MEDICINE
Payer: MEDICARE

## 2018-03-19 VITALS
TEMPERATURE: 96.8 F | HEART RATE: 62 BPM | WEIGHT: 230 LBS | RESPIRATION RATE: 18 BRPM | BODY MASS INDEX: 33.97 KG/M2 | OXYGEN SATURATION: 96 % | SYSTOLIC BLOOD PRESSURE: 127 MMHG | DIASTOLIC BLOOD PRESSURE: 71 MMHG

## 2018-03-19 DIAGNOSIS — S91.209A TOENAIL AVULSION, INITIAL ENCOUNTER: Primary | ICD-10-CM

## 2018-03-19 PROCEDURE — 99282 EMERGENCY DEPT VISIT SF MDM: CPT

## 2018-03-19 NOTE — ED NOTES
Discharge instructions were given to the patient by Juanita Ding The patient left the Emergency Department ambulatory, alert and oriented and in no acute distress with 0 prescriptions. The patient was encouraged to call or return to the ED for worsening issues or problems and was encouraged to schedule a follow up appointment for continuing care. The patient verbalized understanding of discharge instructions and prescriptions, all questions were answered. The patient has no further concerns at this time.

## 2018-03-19 NOTE — ED TRIAGE NOTES
Emergency Department Nursing Plan of Care       The Nursing Plan of Care is developed from the Nursing assessment and Emergency Department Attending provider initial evaluation. The plan of care may be reviewed in the ED Provider note.     The Plan of Care was developed with the following considerations:   Patient / Family readiness to learn indicated by:verbalized understanding  Persons(s) to be included in education: patient  Barriers to Learning/Limitations:No    Signed     Adamaris Pretty RN    3/19/2018   3:42 AM

## 2018-03-19 NOTE — ED PROVIDER NOTES
EMERGENCY DEPARTMENT HISTORY AND PHYSICAL EXAM      Date: 3/19/2018  Patient Name: Carole De La Paz    History of Presenting Illness     No chief complaint on file. History Provided By: Patient    HPI: Carole De La Paz, 76 y.o. male with PMHx significant for COPD and DM, presents ambulatory to the ED for further evaluation of BL great toe bleeding x just PTA. The pt states that he had BL nail extractions of his great toes ~1 year ago and over the last few days he admits to picking calluses off his feet causing some bleeding. He expresses that tonight he picked something out of his left great toe and began to bleed profusely from his great toe leading him to the ED. He discloses that he is on Xarelto and is a diabetic. He denies any fevers, chills, chest pain, SOB, abdominal pain, nausea, vomiting, or diarrhea. PCP: Cecilia Haynes. KATHARINA Bai   SHx: (+) Tobacco; (-) ETOH; (-) Illicit drug use    There are no other complaints, changes, or physical findings at this time. Current Outpatient Prescriptions   Medication Sig Dispense Refill    rivaroxaban (XARELTO) 10 mg tablet Take 1 Tab by mouth two (2) times daily (with meals). Indications: PREVENTION OF PULMONARY THROMBOEMBOLISM RECURRENCE 60 Tab 12    empagliflozin (JARDIANCE) 25 mg tablet Take 1 Tab by mouth daily. 90 Tab 2    tiotropium (SPIRIVA) 18 mcg inhalation capsule Take 1 Cap by inhalation daily. 90 Cap 0    nitroglycerin (NITROSTAT) 0.4 mg SL tablet 1 Tab by SubLINGual route every five (5) minutes as needed for Chest Pain. 15 Tab 0    metFORMIN (GLUCOPHAGE) 500 mg tablet Take 1 Tab by mouth two (2) times daily (with meals). 180 Tab 0    atorvastatin (LIPITOR) 20 mg tablet Take 1 Tab by mouth nightly. 90 Tab 0    acetaminophen (TYLENOL) 325 mg tablet Take 1 Tab by mouth every four (4) hours as needed for Pain. 90 Tab 0    fluticasone (FLONASE) 50 mcg/actuation nasal spray 2 Sprays by Both Nostrils route daily.  1 Bottle 0    BLOOD-GLUCOSE METER (FREESTYLE LITE METER) by Does Not Apply route.  glucose blood VI test strips (ASCENSIA AUTODISC VI, ONE TOUCH ULTRA TEST VI) strip Freestyle. Check sugar once daily fasting. E11.65 100 Strip 11    Lancets misc Use as directed. Dx: check sugar once daily. E11.65 freestyle 100 Each 11    aspirin delayed-release 81 mg tablet Take  by mouth daily.  fluticasone-salmeterol (ADVAIR) 100-50 mcg/dose diskus inhaler Take 1 Puff by inhalation every twelve (12) hours.  metoprolol tartrate (LOPRESSOR) 25 mg tablet Take 25 mg by mouth two (2) times a day.  albuterol (PROVENTIL HFA, VENTOLIN HFA, PROAIR HFA) 90 mcg/actuation inhaler Take 2 Puffs by inhalation. Past History     Past Medical History:  Past Medical History:   Diagnosis Date    COPD     bronchitis    Diabetes ([facility] [address]        Past Surgical History:  Past Surgical History:   Procedure Laterality Date    CARDIAC SURG PROCEDURE UNLIST      HX CORONARY STENT PLACEMENT      twice, last 2015       Family History:  Family History   Problem Relation Age of Onset    Cancer Mother      breast    Heart Attack Mother        Social History:  Social History   Substance Use Topics    Smoking status: Current Every Day Smoker     Packs/day: 1.00    Smokeless tobacco: Never Used      Comment: less than  1 pack per day    Alcohol use No      Comment: stopped 1994       Allergies:  No Known Allergies      Review of Systems   Review of Systems   Constitutional: Negative for chills and fever. HENT: Negative for congestion, rhinorrhea, sneezing and sore throat. Eyes: Negative for redness and visual disturbance. Respiratory: Negative for shortness of breath. Cardiovascular: Negative for chest pain and leg swelling. Gastrointestinal: Negative for abdominal pain, nausea and vomiting. Genitourinary: Negative for difficulty urinating and frequency. Musculoskeletal: Negative for back pain, myalgias and neck stiffness.    Skin: Positive for wound (BL great toes ). Negative for rash. Neurological: Negative for dizziness, syncope, weakness and headaches. Hematological: Negative for adenopathy. All other systems reviewed and are negative. Physical Exam   Physical Exam   Constitutional: He is oriented to person, place, and time. He appears well-developed and well-nourished. HENT:   Head: Normocephalic and atraumatic. Nose: Nose normal.   Mouth/Throat: Oropharynx is clear and moist.   Eyes: Conjunctivae and EOM are normal. Pupils are equal, round, and reactive to light. Neck: Normal range of motion. Neck supple. Cardiovascular: Normal rate, regular rhythm, normal heart sounds and intact distal pulses. Pulmonary/Chest: Effort normal and breath sounds normal.   Abdominal: Soft. Bowel sounds are normal. He exhibits no distension. Musculoskeletal: Normal range of motion. He exhibits no edema. Neurological: He is alert and oriented to person, place, and time. He exhibits normal muscle tone. Skin: Skin is warm and dry. No erythema. Torn skin of the BL great toes from self manipulation    Psychiatric: He has a normal mood and affect. His behavior is normal. Judgment and thought content normal.         Diagnostic Study Results       Medical Decision Making   I am the first provider for this patient. I reviewed the vital signs, available nursing notes, past medical history, past surgical history, family history and social history. Vital Signs-Reviewed the patient's vital signs.   Patient Vitals for the past 12 hrs:   Temp Pulse Resp BP SpO2   03/19/18 0330 96.8 °F (36 °C) 62 18 127/71 96 %       Pulse Oximetry Analysis - 96% on room air    Cardiac Monitor:   Rate: 62 bpm  Rhythm: Normal Sinus Rhythm        Records Reviewed: Nursing Notes, Old Medical Records, Previous electrocardiograms, Previous Radiology Studies and Previous Laboratory Studies    Provider Notes (Medical Decision Making):     DDx: ingrown toenail extraction vs nail fungus. ED Course:   Initial assessment performed. The patients presenting problems have been discussed, and they are in agreement with the care plan formulated and outlined with them. I have encouraged them to ask questions as they arise throughout their visit. Progress Notes:    Tobacco Counseling  Discussed the risks of smoking and the benefits of smoking cessation as well as the long term sequelae of smoking with the patient. The patient verbalized their understanding. Critical Care Time: 0 minutes    Disposition:  Discharge Note:  3:36 AM  The patient is ready for discharge. The patient's signs, symptoms, diagnosis, and discharge instruction have been discussed and the patient has conveyed their understanding. The patient is to follow up as recommended or return to the ER should their symptoms worsen. Plan has been discussed and the patient is in agreement. Written by Henrry Fountain ED Scribe, as dictated by Zay Phan MD    PLAN:  1. Current Discharge Medication List        2. Follow-up Information     Follow up With Details Comments Contact Info    Marilou Burk NP Call  [place] [id number]  [phone]      [facility]    [facility] [id number]      [address] [address]          Return to ED if worse     Diagnosis     Clinical Impression:   1. Toenail avulsion, initial encounter        Attestations:    Attestation: This note is prepared by Sophia Fountain, acting as Scribe for Zay Phan MD.      Zay Phan MD: The scribe's documentation has been prepared under my direction and personally reviewed by me in its entirety. I confirm that the note above accurately reflects all work, treatment, procedures, and medical decision making performed by me.

## 2018-03-19 NOTE — ED TRIAGE NOTES
Patient presents to ED with c/o bilateral big toe pain with bleeding. Patient states that this occurred 20 min pta after pulling off toenail. Patient states that he takes blood thinner and is a diabetic.

## 2018-03-19 NOTE — DISCHARGE INSTRUCTIONS
Toenail or Fingernail Avulsion: Care Instructions  Your Care Instructions  Losing a toenail or fingernail because of an injury is called avulsion. The nail may be completely or partially torn off after a trauma to the area. Your doctor may have removed the nail, put part of it back into place, or repaired the nail bed. Your toe or finger may be sore after treatment. You may have stitches. You may have some swelling, color changes, and bloody crusting on or around the wound for 2 or 3 days. This is normal. Taking good care of your wound at home will help it heal quickly and reduce your chance of infection. The wound should heal within a few weeks. If completely removed, fingernails may take 6 months to grow back. Toenails may take 12 to 18 months to grow back. Injured nails may look different when they grow back. Follow-up care is a key part of your treatment and safety. Be sure to make and go to all appointments, and call your doctor if you are having problems. It's also a good idea to know your test results and keep a list of the medicines you take. How can you care for yourself at home? · If possible, prop up the injured area on a pillow anytime you sit or lie down during the next 3 days. Try to keep it above the level of your heart. This will help reduce swelling. · Leave the bandage on, and if you have stitches, do not get them wet for the first 24 to 48 hours. Use a plastic bag to cover the area when you shower. · If your doctor told you how to care for your wound, follow your doctor's instructions. If you did not get instructions, follow this general advice:  ¨ After the first 24 to 48 hours, you can remove the bandage and gently wash around the wound with clean water 2 times a day. If the bandage sticks to the wound, use warm water to loosen it. Do not scrub or soak the area. ¨ You may cover the wound with a thin layer of petroleum jelly, such as Vaseline, and a nonstick bandage.   ¨ Apply more petroleum jelly and replace the bandage as needed. · Do not go swimming. · If you have stitches, do not remove them on your own. Your doctor will tell you when to return to have the stitches removed. · Be safe with medicines. Take pain medicines exactly as directed. ¨ If the doctor gave you a prescription medicine for pain, take it as prescribed. ¨ If you are not taking a prescription pain medicine, ask your doctor if you can take an over-the-counter medicine. · If your doctor prescribed antibiotics, take them as directed. Do not stop taking them just because you feel better. You need to take the full course of antibiotics. When should you call for help? Call your doctor now or seek immediate medical care if:  ? · The skin near the wound is cool or pale or changes color. ? · The wound starts to bleed, and blood soaks through the bandage. Oozing small amounts of blood is normal.   ? · You have signs of infection, such as:  ¨ Increased pain, swelling, warmth, or redness. ¨ Red streaks leading from your toe or finger. ¨ Pus draining from your toe or finger. ¨ Swollen lymph nodes in your neck, armpits, or groin. ¨ A fever. ? Watch closely for changes in your health, and be sure to contact your doctor if:  ? · You have problems with the nail as it grows back. ? · You do not get better as expected. Where can you learn more? Go to http://dank-damien.info/. Enter F019 in the search box to learn more about \"Toenail or Fingernail Avulsion: Care Instructions. \"  Current as of: October 13, 2016  Content Version: 11.4  © 3927-2329 [employer]. Care instructions adapted under license by [employer] (which disclaims liability or warranty for this information). If you have questions about a medical condition or this instruction, always ask your healthcare professional. [address] any warranty or liability for your use of this information.

## 2018-03-20 NOTE — TELEPHONE ENCOUNTER
This writer reaches out to Mr. Cecilia Mack, verifies name, addr and  as pt identifiers. Mr. Cecilia Mack says he needs assistance with meals, dressing and baths. He acknowledges having  insurance since . It is suggested that he contact his  at the [place] to request a personal care aide. He believes his is a [facility] and has agreed to go there for further assistance. PCP advised of conversation.

## 2018-04-15 ENCOUNTER — APPOINTMENT (OUTPATIENT)
Dept: GENERAL RADIOLOGY | Age: 68
End: 2018-04-15
Attending: EMERGENCY MEDICINE
Payer: MEDICARE

## 2018-04-15 ENCOUNTER — HOSPITAL ENCOUNTER (EMERGENCY)
Age: 68
Discharge: HOME | End: 2018-04-15
Attending: EMERGENCY MEDICINE | Admitting: EMERGENCY MEDICINE
Payer: MEDICARE

## 2018-04-15 VITALS
HEART RATE: 65 BPM | BODY MASS INDEX: 34.21 KG/M2 | HEIGHT: 69 IN | RESPIRATION RATE: 16 BRPM | TEMPERATURE: 98.6 F | OXYGEN SATURATION: 100 % | SYSTOLIC BLOOD PRESSURE: 128 MMHG | WEIGHT: 231 LBS | DIASTOLIC BLOOD PRESSURE: 69 MMHG

## 2018-04-15 DIAGNOSIS — R55 NEAR SYNCOPE: ICD-10-CM

## 2018-04-15 DIAGNOSIS — R07.9 CHEST PAIN, UNSPECIFIED TYPE: Primary | ICD-10-CM

## 2018-04-15 PROCEDURE — 99284 EMERGENCY DEPT VISIT MOD MDM: CPT

## 2018-04-15 PROCEDURE — 93005 ELECTROCARDIOGRAM TRACING: CPT

## 2018-04-15 PROCEDURE — 71045 X-RAY EXAM CHEST 1 VIEW: CPT

## 2018-04-15 NOTE — ED NOTES
Patient complaining of dizziness and lightheadedness x 1 hour. Patient stated he felt like he was going to faint. States he has had previous issues with his blood sugar dropping too low, BS before arrival was 79. Patient states he does not use insulin. Emergency Department Nursing Plan of Care       The Nursing Plan of Care is developed from the Nursing assessment and Emergency Department Attending provider initial evaluation. The plan of care may be reviewed in the ED Provider note.     The Plan of Care was developed with the following considerations:   Patient / Family readiness to learn indicated by:verbalized understanding  Persons(s) to be included in education: patient  Barriers to Learning/Limitations:No    Signed     Monique Lopez    4/15/2018   2:39 PM

## 2018-04-15 NOTE — DISCHARGE INSTRUCTIONS
Lightheadedness or Faintness: Care Instructions  Your Care Instructions  Lightheadedness is a feeling that you are about to faint or \"pass out. \" You do not feel as if you or your surroundings are moving. It is different from vertigo, which is the feeling that you or things around you are spinning or tilting. Lightheadedness usually goes away or gets better when you lie down. If lightheadedness gets worse, it can lead to a fainting spell. It is common to feel lightheaded from time to time. Lightheadedness usually is not caused by a serious problem. It often is caused by a short-lasting drop in blood pressure and blood flow to your head that occurs when you get up too quickly from a seated or lying position. Follow-up care is a key part of your treatment and safety. Be sure to make and go to all appointments, and call your doctor if you are having problems. It's also a good idea to know your test results and keep a list of the medicines you take. How can you care for yourself at home? · Lie down for 1 or 2 minutes when you feel lightheaded. After lying down, sit up slowly and remain sitting for 1 to 2 minutes before slowly standing up. · Avoid movements, positions, or activities that have made you lightheaded in the past.  · Get plenty of rest, especially if you have a cold or flu, which can cause lightheadedness. · Make sure you drink plenty of fluids, especially if you have a fever or have been sweating. · Do not drive or put yourself and others in danger while you feel lightheaded. When should you call for help? Call 911 anytime you think you may need emergency care. For example, call if:  ? · You have symptoms of a stroke. These may include:  ¨ Sudden numbness, tingling, weakness, or loss of movement in your face, arm, or leg, especially on only one side of your body. ¨ Sudden vision changes. ¨ Sudden trouble speaking. ¨ Sudden confusion or trouble understanding simple statements.   ¨ Sudden problems with walking or balance. ¨ A sudden, severe headache that is different from past headaches. ? · You have symptoms of a heart attack. These may include:  ¨ Chest pain or pressure, or a strange feeling in the chest.  ¨ Sweating. ¨ Shortness of breath. ¨ Nausea or vomiting. ¨ Pain, pressure, or a strange feeling in the back, neck, jaw, or upper belly or in one or both shoulders or arms. ¨ Lightheadedness or sudden weakness. ¨ A fast or irregular heartbeat. After you call 911, the  may tell you to chew 1 adult-strength or 2 to 4 low-dose aspirin. Wait for an ambulance. Do not try to drive yourself. ? Watch closely for changes in your health, and be sure to contact your doctor if:  ? · Your lightheadedness gets worse or does not get better with home care. Where can you learn more? Go to http://dank-damien.info/. Enter S711 in the search box to learn more about \"Lightheadedness or Faintness: Care Instructions. \"  Current as of: March 20, 2017  Content Version: 11.4  © 0671-3056 [employer]. Care instructions adapted under license by [employer] (which disclaims liability or warranty for this information). If you have questions about a medical condition or this instruction, always ask your healthcare professional. [address] any warranty or liability for your use of this information. Chest Pain: Care Instructions  Your Care Instructions    There are many things that can cause chest pain. Some are not serious and will get better on their own in a few days. But some kinds of chest pain need more testing and treatment. Your doctor may have recommended a follow-up visit in the next 8 to 12 hours. If you are not getting better, you may need more tests or treatment. Even though your doctor has released you, you still need to watch for any problems. The doctor carefully checked you, but sometimes problems can develop later. If you have new symptoms or if your symptoms do not get better, get medical care right away. If you have worse or different chest pain or pressure that lasts more than 5 minutes or you passed out (lost consciousness), call 911 or seek other emergency help right away. A medical visit is only one step in your treatment. Even if you feel better, you still need to do what your doctor recommends, such as going to all suggested follow-up appointments and taking medicines exactly as directed. This will help you recover and help prevent future problems. How can you care for yourself at home? · Rest until you feel better. · Take your medicine exactly as prescribed. Call your doctor if you think you are having a problem with your medicine. · Do not drive after taking a prescription pain medicine. When should you call for help? Call 911 if:  ? · You passed out (lost consciousness). ? · You have severe difficulty breathing. ? · You have symptoms of a heart attack. These may include:  ¨ Chest pain or pressure, or a strange feeling in your chest.  ¨ Sweating. ¨ Shortness of breath. ¨ Nausea or vomiting. ¨ Pain, pressure, or a strange feeling in your back, neck, jaw, or upper belly or in one or both shoulders or arms. ¨ Lightheadedness or sudden weakness. ¨ A fast or irregular heartbeat. After you call 911, the  may tell you to chew 1 adult-strength or 2 to 4 low-dose aspirin. Wait for an ambulance. Do not try to drive yourself. ?Call your doctor today if:  ? · You have any trouble breathing. ? · Your chest pain gets worse. ? · You are dizzy or lightheaded, or you feel like you may faint. ? · You are not getting better as expected. ? · You are having new or different chest pain. Where can you learn more? Go to http://dank-damien.info/. Enter A120 in the search box to learn more about \"Chest Pain: Care Instructions. \"  Current as of: March 20, 2017  Content Version: 11.4  © 1794-5105 [employer]. Care instructions adapted under license by [employer] (which disclaims liability or warranty for this information). If you have questions about a medical condition or this instruction, always ask your healthcare professional. [address] any warranty or liability for your use of this information.

## 2018-04-15 NOTE — ED PROVIDER NOTES
EMERGENCY DEPARTMENT HISTORY AND PHYSICAL EXAM      Date: 4/15/2018  Patient Name: Anjel Bal    History of Presenting Illness     Chief Complaint   Patient presents with    Dizziness       History Provided By: Patient    HPI: Anjel Bal, 76 y.o. male with PMHx significant for COPD, DM, presents ambulatory to the ED with cc of chronic episodic SOB with associated lightheadedness and dizziness x 4 years. Pt reports an exacerbation of his symptoms today which concerned him and prompted him to come into the ED. When I walked into the room to interview the pt he stated \"I think I want to go home\". He has been having similar symptoms in the past and has been evaluated by multiple providers with inconclusive evaluations. He does adamantly express wanting to go home multiple times. In the room pt was able to verbalize the risks of leaving the ED prior to getting labs drawn. Pt denies any CP, abdominal pain, nausea, vomiting, diarrhea. Social Hx: + Tobacco (1 ppd), - EtOH (-), - illicit drug use (-)    PCP: Shawna Bai NP    There are no other complaints, changes, or physical findings at this time. Current Outpatient Prescriptions   Medication Sig Dispense Refill    rivaroxaban (XARELTO) 10 mg tablet Take 1 Tab by mouth two (2) times daily (with meals). Indications: PREVENTION OF PULMONARY THROMBOEMBOLISM RECURRENCE 60 Tab 12    empagliflozin (JARDIANCE) 25 mg tablet Take 1 Tab by mouth daily. 90 Tab 2    nitroglycerin (NITROSTAT) 0.4 mg SL tablet 1 Tab by SubLINGual route every five (5) minutes as needed for Chest Pain. 15 Tab 0    metFORMIN (GLUCOPHAGE) 500 mg tablet Take 1 Tab by mouth two (2) times daily (with meals). 180 Tab 0    BLOOD-GLUCOSE METER (FREESTYLE LITE METER) by Does Not Apply route.  glucose blood VI test strips (ASCENSIA AUTODISC VI, ONE TOUCH ULTRA TEST VI) strip Freestyle. Check sugar once daily fasting. E11.65 100 Strip 11    Lancets misc Use as directed.  Dx: check sugar once daily. E11.65 freestyle 100 Each 11    aspirin delayed-release 81 mg tablet Take  by mouth daily.  fluticasone-salmeterol (ADVAIR) 100-50 mcg/dose diskus inhaler Take 1 Puff by inhalation every twelve (12) hours.  metoprolol tartrate (LOPRESSOR) 25 mg tablet Take 25 mg by mouth two (2) times a day.  tiotropium (SPIRIVA) 18 mcg inhalation capsule Take 1 Cap by inhalation daily. 90 Cap 0    atorvastatin (LIPITOR) 20 mg tablet Take 1 Tab by mouth nightly. 90 Tab 0    acetaminophen (TYLENOL) 325 mg tablet Take 1 Tab by mouth every four (4) hours as needed for Pain. 90 Tab 0    fluticasone (FLONASE) 50 mcg/actuation nasal spray 2 Sprays by Both Nostrils route daily. 1 Bottle 0    albuterol (PROVENTIL HFA, VENTOLIN HFA, PROAIR HFA) 90 mcg/actuation inhaler Take 2 Puffs by inhalation. Past History     Past Medical History:  Past Medical History:   Diagnosis Date    COPD     bronchitis    Diabetes ([facility] [address]        Past Surgical History:  Past Surgical History:   Procedure Laterality Date    CARDIAC SURG PROCEDURE UNLIST      HX CORONARY STENT PLACEMENT      twice, last 2015       Family History:  Family History   Problem Relation Age of Onset    Cancer Mother      breast    Heart Attack Mother        Social History:  Social History   Substance Use Topics    Smoking status: Current Every Day Smoker     Packs/day: 1.00    Smokeless tobacco: Never Used      Comment: less than  1 pack per day    Alcohol use No      Comment: stopped 1994       Allergies:  No Known Allergies      Review of Systems   Review of Systems   Constitutional: Negative for chills and fever. HENT: Negative for congestion and sore throat. Eyes: Negative for visual disturbance. Respiratory: Positive for shortness of breath. Negative for cough. Cardiovascular: Negative for chest pain and leg swelling. Gastrointestinal: Negative for abdominal pain, blood in stool, diarrhea and nausea.    Endocrine: Negative for polyuria. Genitourinary: Negative for dysuria and testicular pain. Musculoskeletal: Negative for arthralgias, joint swelling and myalgias. Skin: Negative for rash. Allergic/Immunologic: Negative for immunocompromised state. Neurological: Positive for dizziness and light-headedness. Negative for weakness and headaches. Hematological: Does not bruise/bleed easily. Psychiatric/Behavioral: Negative for confusion. Physical Exam   Physical Exam   Constitutional: He is oriented to person, place, and time. He appears well-developed and well-nourished. HENT:   Head: Normocephalic and atraumatic. Moist mucous membranes   Eyes: Conjunctivae are normal. Pupils are equal, round, and reactive to light. Right eye exhibits no discharge. Left eye exhibits no discharge. Neck: Normal range of motion. Neck supple. No tracheal deviation present. Cardiovascular: Normal rate, regular rhythm and normal heart sounds. No murmur heard. Pulmonary/Chest: Effort normal and breath sounds normal. No respiratory distress. He has no wheezes. He has no rales. Abdominal: Soft. Bowel sounds are normal. There is no tenderness. There is no rebound and no guarding. Musculoskeletal: Normal range of motion. He exhibits no edema, tenderness or deformity. Neurological: He is alert and oriented to person, place, and time. Skin: Skin is warm and dry. No rash noted. No erythema. Psychiatric: His behavior is normal.   Nursing note and vitals reviewed.         Diagnostic Study Results     Labs -     Recent Results (from the past 12 hour(s))   EKG, 12 LEAD, INITIAL    Collection Time: 04/15/18  2:29 PM   Result Value Ref Range    Ventricular Rate 65 BPM    Atrial Rate 65 BPM    P-R Interval 170 ms    QRS Duration 100 ms    Q-T Interval 404 ms    QTC Calculation (Bezet) 420 ms    Calculated P Axis 51 degrees    Calculated R Axis 58 degrees    Calculated T Axis 52 degrees    Diagnosis       Normal sinus rhythm  Normal ECG  When compared with ECG of 30-DEC-2017 16:57,  No significant change was found         Radiologic Studies -   CXR Results  (Last 48 hours)               04/15/18 1448  XR CHEST PORT Final result    Impression:  IMPRESSION: No infiltrate               Narrative:  EXAM:  XR CHEST PORT       INDICATION:  chest pain       COMPARISON:  1/12/2018       FINDINGS: A portable AP radiograph of the chest was obtained at 1446 hours. There is a slight accentuation of interstitial markings. The cardiac and   mediastinal contours and pulmonary vascularity are normal. A cardiac stent is in   place. The bones and soft tissues are grossly within normal limits. Medical Decision Making   I am the first provider for this patient. I reviewed the vital signs, available nursing notes, past medical history, past surgical history, family history and social history. Vital Signs-Reviewed the patient's vital signs. Patient Vitals for the past 12 hrs:   Temp Pulse Resp BP SpO2   04/15/18 1432 98.6 °F (37 °C) 65 16 128/69 100 %     EKG interpretation: (Preliminary) 14:29  Rhythm: normal sinus rhythm; and regular . Rate (approx.): 665; Axis: normal; P wave: normal; QRS interval: normal ; ST/T wave: no ST elevation, no ST depression; No ischemic changes. AL interval 170 ms,  ms,  ms, QTc 420 ms. Interpreted by [employer], DO    Records Reviewed: Nursing Notes and Old Medical Records    Provider Notes (Medical Decision Making):   Patient presents with CP. DDx:  ACS, Aortic dissection, PNA, PE, PTX, pericarditis, myocarditis, GERD, costochondritis, anxiety. Most likely patient's chronic chest pain given the HPI and Physical exam. Will obtain labs, CXR, EKG. - I have re-examined the patient and the patient denies chest pain on re-examination.   The patient has had onset of chest pain greater than 8 hours and one negative set of cardiac enzymes or 2 negative sets of cardiac enzymes in the ER during this visit. The diagnosis, follow up, return instructions, test results, x-rays and medications have been discussed and reviewed with the patient. The patient has been given the opportunity to ask questions. The patient  expresses understanding of the diagnosis, follow-up and return instructions. The patient agrees to follow up with primary care or cardiology as directed and to return immediately if the chest pain worsens. The patient expresses understanding that although cardiac testing at this time is negative, a cardiac problem could still be present and that a follow-up appointment for further evaluation and risk factor modification is necessary to complete the evaluation of this complaint. ED Course:   Initial assessment performed. The patients presenting problems have been discussed, and they are in agreement with the care plan formulated and outlined with them. I have encouraged them to ask questions as they arise throughout their visit. PROGRESS NOTE:  2:54 PM  Pt verbalized understanding of the risks associated with leaving prior to blood tests, he is of sound mind, will discharge per pt request.     Critical Care Time:   None. Disposition:  2:55 PM    I informed the pt that he needed blood work for adequate evaluation for his SOB and dizziness, and that by refusing the above, he is at risk for myocardial infarction, arrhythmia, sudden death, further deterioration. He is awake, alert, and he understands his condition and the risks involved in leaving. Pt has not received any medications in the ED today. He is clinically aware of his surroundings and able to ask appropriate questions, the nurse present confirms he is not clinically intoxicated and able to make medical decisions. He verbalized knowing the risks and understood it was recommended that he stay and could also return at any time.  Pt provided with warnings regarding worsening of his condition and were provided instructions to follow up with Eduarda Forte NP tomorrow or return to the Emergency Room as soon as possible. This discussion was witnessed by ED scribe Yves Sinclair. PLAN:  1. Discharge Medication List as of 4/15/2018  2:57 PM        2. Follow-up Information     Follow up With Details Comments Contact Info    Marilou Forte NP Schedule an appointment as soon as possible for a visit  [address] EMERGENCY DEPT  If symptoms worsen [facility]  [phone]        Return to ED if worse     Diagnosis     Clinical Impression:   1. Chest pain, unspecified type    2. Near syncope        Attestations: This note is prepared by Yves Sinclair acting as Scribe for DO Jose Alfredo Macario DO : The scribe's documentation has been prepared under my direction and personally reviewed by me in its entirety. I confirm that the note above accurately reflects all work, treatment, procedures, and medical decision making performed by me.

## 2018-04-15 NOTE — ED NOTES
Per Dr. Briana Corral, patient refused labwork.   Patient was discharged by provider at [phone]

## 2018-04-15 NOTE — ED TRIAGE NOTES
Patient complaining of dizziness and lightheadedness x 1 hour. Stated he felt like he was going to faint.

## 2018-04-16 LAB
ATRIAL RATE: 65 BPM
CALCULATED P AXIS, ECG09: 51 DEGREES
CALCULATED R AXIS, ECG10: 58 DEGREES
CALCULATED T AXIS, ECG11: 52 DEGREES
DIAGNOSIS, 93000: NORMAL
P-R INTERVAL, ECG05: 170 MS
Q-T INTERVAL, ECG07: 404 MS
QRS DURATION, ECG06: 100 MS
QTC CALCULATION (BEZET), ECG08: 420 MS
VENTRICULAR RATE, ECG03: 65 BPM

## 2018-05-10 ENCOUNTER — OFFICE VISIT (OUTPATIENT)
Dept: ONCOLOGY | Age: 68
End: 2018-05-10

## 2018-05-10 VITALS
BODY MASS INDEX: 31.27 KG/M2 | OXYGEN SATURATION: 94 % | TEMPERATURE: 95.8 F | DIASTOLIC BLOOD PRESSURE: 81 MMHG | SYSTOLIC BLOOD PRESSURE: 132 MMHG | RESPIRATION RATE: 18 BRPM | WEIGHT: 211.1 LBS | HEIGHT: 69 IN | HEART RATE: 69 BPM

## 2018-05-10 DIAGNOSIS — D68.9 BLOOD CLOTTING DISORDER (HCC): Primary | ICD-10-CM

## 2018-05-10 DIAGNOSIS — I82.721 ARM DVT (DEEP VENOUS THROMBOEMBOLISM), CHRONIC, RIGHT (HCC): ICD-10-CM

## 2018-05-10 DIAGNOSIS — I82.402 DEEP VEIN THROMBOSIS (DVT) OF LEFT LOWER EXTREMITY, UNSPECIFIED CHRONICITY, UNSPECIFIED VEIN (HCC): ICD-10-CM

## 2018-05-10 NOTE — PROGRESS NOTES
[address]  Social Work Navigator Encounter     Patient Name:  Marbin Payne    Medical History: dx blood clotting disorder - 1st and only clot 2015 ? 2016    Advance Directives:    Narrative: SW called [facility] and med/Xarelto- $28.  Pt given choice to take or not.  to order doppler of right arms & left leg to see if the clot has resolved. Barriers to Care:     Plan:     1. Pt states his meds $200. Pt to work out with PCP cost of other meds.

## 2018-05-10 NOTE — PROGRESS NOTES
Hematology Consultation        Patient: Aishwarya Padilla MRN: [id number]  SSN: [id number]    YOB: 1950  Age: 76 y.o. Sex: male      Subjective:      Aishwarya Padilla is a 76 y.o. male who I am seeing for history of VTE and thrombophilia. IN 2015 he suffered an unprovoked episode of RUE and LLE DVT. He experienced swelling and went to the hospital in [facility]. Laboratory studies showed a presence of prothrombin gene mutation. He has been on systemic anticoagulation since. Lately he has stopped all his medication because he feels does not need them and they are too expensive. He denies swelling in arms or legs. No dyspnea. He is retired from [employer]. Review of Systems:    Constitutional: negative  Eyes: negative  Ears, Nose, Mouth, Throat, and Face: negative  Respiratory: negative  Cardiovascular: negative  Gastrointestinal: negative  Genitourinary:negative  Integument/Breast: negative  Hematologic/Lymphatic: negative  Musculoskeletal:negative  Neurological: negative          Past Medical History:   Diagnosis Date    COPD     bronchitis    Diabetes ([facility] [address]      Past Surgical History:   Procedure Laterality Date    CARDIAC SURG PROCEDURE UNLIST      HX CORONARY STENT PLACEMENT      twice, last 2015      Family History   Problem Relation Age of Onset    Cancer Mother      breast    Heart Attack Mother      Social History   Substance Use Topics    Smoking status: Current Every Day Smoker     Packs/day: 1.00    Smokeless tobacco: Never Used      Comment: less than  1 pack per day    Alcohol use No      Comment: stopped 1994      Prior to Admission medications    Medication Sig Start Date End Date Taking? Authorizing Provider   empagliflozin (JARDIANCE) 25 mg tablet Take 1 Tab by mouth daily. 2/27/18   Marilou Bai NP   tiotropium (SPIRIVA) 18 mcg inhalation capsule Take 1 Cap by inhalation daily. 2/27/18   Marilou Bai NP   nitroglycerin (NITROSTAT) 0.4 mg SL tablet 1 Tab by SubLINGual route every five (5) minutes as needed for Chest Pain. 2/27/18   Marilou Bai NP   metFORMIN (GLUCOPHAGE) 500 mg tablet Take 1 Tab by mouth two (2) times daily (with meals). 2/27/18   Marilou Bai NP   atorvastatin (LIPITOR) 20 mg tablet Take 1 Tab by mouth nightly. 2/27/18   Marilou Bentley NP   acetaminophen (TYLENOL) 325 mg tablet Take 1 Tab by mouth every four (4) hours as needed for Pain. 2/27/18   Marilou Bai NP   fluticasone (FLONASE) 50 mcg/actuation nasal spray 2 Sprays by Both Nostrils route daily. 1/12/18   Marilou Bentley NP   BLOOD-GLUCOSE METER (FREESTYLE LITE METER) by Does Not Apply route. Historical Provider   glucose blood VI test strips (ASCENSIA AUTODISC VI, ONE TOUCH ULTRA TEST VI) strip Freestyle. Check sugar once daily fasting. E11.65 1/12/18   Marilou Bentley NP   Lancets misc Use as directed. Dx: check sugar once daily. E11.65 freestyle 1/12/18   Marilou Bai NP   aspirin delayed-release 81 mg tablet Take  by mouth daily. Braydon Liang MD   fluticasone-salmeterol (ADVAIR) 100-50 mcg/dose diskus inhaler Take 1 Puff by inhalation every twelve (12) hours. Braydon Liang MD   metoprolol tartrate (LOPRESSOR) 25 mg tablet Take 25 mg by mouth two (2) times a day. Braydon Liang MD   albuterol (PROVENTIL HFA, VENTOLIN HFA, PROAIR HFA) 90 mcg/actuation inhaler Take 2 Puffs by inhalation. Braydon Liang MD              No Known Allergies        Objective:     Vitals:    05/10/18 0904   BP: 132/81   Pulse: 69   Resp: 18   Temp: 95.8 °F (35.4 °C)   TempSrc: Oral   SpO2: 94%   Weight: 211 lb 1.6 oz (95.8 kg)   Height: 5' 9\" (1.753 m)            Physical Exam:    GENERAL: alert, cooperative, no distress, appears stated age  EYE: negative  LYMPHATIC: Cervical, supraclavicular, and axillary nodes normal.   THROAT & NECK: normal and no erythema or exudates noted.    LUNG: clear to auscultation bilaterally  HEART: regular rate and rhythm  ABDOMEN: soft, non-tender  EXTREMITIES:  no edema  SKIN: Normal.  NEUROLOGIC: negative                 Assessment:     1. DVT left arm and right leg:    > 1st episode/event in 2015 - unprovoked    He has been on systemic anticoagulation for at least 3 years. He gives a history of having prothrombin gene mutation. No further details of the episode is available. The patient was living in [facility]. The decision about the duration of anticoagulation is not dependent on thrombophilia testing results but on the provoked/unprovoked nature of the VTE. The risk of second event in the next 10 years is around 40%. The greatest risk is in the first 3 months (around 10-15%). In the subsequent years, the risk is about 2-3% every year. ACCP guidelines for management of patients with VTE allows individualization of therapy choices. I had an extended risk benefit discussions with the patient. He vocalized understanding. After weighing the pros and cons, he prefers to be off anticoagulation. He is comfortable with this decision. Moreover he has discontinued all of his medicines at the current time. He feels that the medicines are putting an extraordinary financial burden on him. He is taking ASA and will continue to do so. Plan:       1. D/C Rivaroxaban  2. Return as needed  3. I strongly urged him to visit with his PCP to re-organize the medications. Signed by: Keyona Flower MD                     May 10, 2018         RANI De La Torre MD

## 2018-05-10 NOTE — PROGRESS NOTES
Ian Orozco is a 76 y.o. male new patient seeing provider for Y blood clotting disorder. Patient retired . Patient stated he had a blood clot in 2014 & 2015; 3 clots in left leg and 2 clots in right arm. Patient stated he stopped taking all medications because they cost too much; pt stated even the blood thinner. VS stable. Patient denies pain. No SOB. Smoker. SW informed of pt not taking any medication d/t cost.     Visit Vitals    /81 (BP 1 Location: Left arm, BP Patient Position: Sitting)    Pulse 69    Temp 95.8 °F (35.4 °C) (Oral)    Resp 18    Ht 5' 9\" (1.753 m)    Wt 211 lb 1.6 oz (95.8 kg)    SpO2 94%    BMI 31.17 kg/m2     Health Maintenance Review: Patient reminded of \"due or due soon\" health maintenance. I have asked the patient to contact his/her primary care provider (PCP) for follow-up on his/her health maintenance.

## 2018-05-14 ENCOUNTER — PATIENT OUTREACH (OUTPATIENT)
Dept: INTERNAL MEDICINE CLINIC | Age: 68
End: 2018-05-14

## 2018-05-14 NOTE — Clinical Note
Attila Zamarripa,  I didn't get him on the phone today, will try back by [facility]. I looked into his  formulary and will try to get him to try their mail order option. It's [facility] and he can get a 90 day supply for $7. I will try to go thru each med before he comes in so you will know which to switch out. Also I see for Jardiance there is a short 3 question PA. I will print those as needed too.

## 2018-05-14 NOTE — LETTER
5/16/2018 Mr. Rodney Valdes [address] 
[address] [facility] [id number] Dear Rodney Valdes I am a Nurse Navigator working within your primary care doctor's office- ISAI Cortes NP. Part of my job is to follow up with patients that your doctor has identified as needing assistance. I have been unable to reach you by telephone and will be most appreciative of you calling me at the number(s) provided below. I would like to discuss options to more affordable medications and scheduling your next appointment here with  Jovani Monet NP. Thank you for allowing us to participate in your care! Sincerely, 
Bernice Barker, RN 
Nurse Navigator [phone]

## 2018-05-14 NOTE — PROGRESS NOTES
This writer receives written referral from PCP to contact pt about his medications. She's been informed he is no longer taking medications d/t the cost. Today's call goes unanswered, lmom with contact information for return call. If no response will try pt again in 2-3 business days. Per chart review this writer learns pt's has [employer]ricFindants in addition to Dekkun. This writer plans to [employer] benefit (Mail Order vs Local vs Local [facility]) and schedule ov so that he and PCP can identify alternatives as needed as she request.    [email or web address]: With current med list this writer accesses [facility] site and learns if pt uses mail order he can obtain a 90 day supply of medications for the price of a 30 day supply at his local pharmacy. Pt currently in radiology, will attempt to reach later today to advise and schedule apt with PCP.     5/16 @ 5276: Unsuccessful attempt to reach pt, lmom. Will generate get in touch letter.

## 2018-05-14 NOTE — Clinical Note
Mic Baum, I have not been able to get him on the phone looks like he didn't go to his apt today. I mailed him a letter today asking that he call me.

## 2018-05-30 ENCOUNTER — HOSPITAL ENCOUNTER (EMERGENCY)
Age: 68
Discharge: HOME | End: 2018-05-30
Attending: EMERGENCY MEDICINE
Payer: MEDICARE

## 2018-05-30 ENCOUNTER — APPOINTMENT (OUTPATIENT)
Dept: CT IMAGING | Age: 68
End: 2018-05-30
Attending: EMERGENCY MEDICINE
Payer: MEDICARE

## 2018-05-30 VITALS
BODY MASS INDEX: 31.98 KG/M2 | SYSTOLIC BLOOD PRESSURE: 128 MMHG | TEMPERATURE: 97.5 F | RESPIRATION RATE: 18 BRPM | OXYGEN SATURATION: 93 % | HEIGHT: 68 IN | WEIGHT: 211 LBS | DIASTOLIC BLOOD PRESSURE: 74 MMHG | HEART RATE: 72 BPM

## 2018-05-30 DIAGNOSIS — R07.9 CHEST PAIN, UNSPECIFIED TYPE: Primary | ICD-10-CM

## 2018-05-30 LAB
ALBUMIN SERPL-MCNC: 3.4 G/DL (ref 3.5–5)
ALBUMIN/GLOB SERPL: 1 {RATIO} (ref 1.1–2.2)
ALP SERPL-CCNC: 115 U/L (ref 45–117)
ALT SERPL-CCNC: 22 U/L (ref 12–78)
ANION GAP SERPL CALC-SCNC: 7 MMOL/L (ref 5–15)
AST SERPL-CCNC: 18 U/L (ref 15–37)
BASOPHILS # BLD: 0.1 K/UL (ref 0–0.1)
BASOPHILS NFR BLD: 1 % (ref 0–1)
BILIRUB SERPL-MCNC: 0.5 MG/DL (ref 0.2–1)
BUN SERPL-MCNC: 15 MG/DL (ref 6–20)
BUN/CREAT SERPL: 13 (ref 12–20)
CALCIUM SERPL-MCNC: 10.3 MG/DL (ref 8.5–10.1)
CHLORIDE SERPL-SCNC: 103 MMOL/L (ref 97–108)
CO2 SERPL-SCNC: 28 MMOL/L (ref 21–32)
CREAT SERPL-MCNC: 1.12 MG/DL (ref 0.7–1.3)
DIFFERENTIAL METHOD BLD: ABNORMAL
EOSINOPHIL # BLD: 0.2 K/UL (ref 0–0.4)
EOSINOPHIL NFR BLD: 3 % (ref 0–7)
ERYTHROCYTE [DISTWIDTH] IN BLOOD BY AUTOMATED COUNT: 13.5 % (ref 11.5–14.5)
GLOBULIN SER CALC-MCNC: 3.4 G/DL (ref 2–4)
GLUCOSE BLD STRIP.AUTO-MCNC: 246 MG/DL (ref 65–100)
GLUCOSE SERPL-MCNC: 241 MG/DL (ref 65–100)
HCT VFR BLD AUTO: 48 % (ref 36.6–50.3)
HGB BLD-MCNC: 16.1 G/DL (ref 12.1–17)
IMM GRANULOCYTES # BLD: 0.1 K/UL (ref 0–0.04)
IMM GRANULOCYTES NFR BLD AUTO: 1 % (ref 0–0.5)
LIPASE SERPL-CCNC: 265 U/L (ref 73–393)
LYMPHOCYTES # BLD: 2.3 K/UL (ref 0.8–3.5)
LYMPHOCYTES NFR BLD: 28 % (ref 12–49)
MCH RBC QN AUTO: 29.5 PG (ref 26–34)
MCHC RBC AUTO-ENTMCNC: 33.5 G/DL (ref 30–36.5)
MCV RBC AUTO: 87.9 FL (ref 80–99)
MONOCYTES # BLD: 0.8 K/UL (ref 0–1)
MONOCYTES NFR BLD: 10 % (ref 5–13)
NEUTS SEG # BLD: 4.6 K/UL (ref 1.8–8)
NEUTS SEG NFR BLD: 57 % (ref 32–75)
NRBC # BLD: 0 K/UL (ref 0–0.01)
NRBC BLD-RTO: 0 PER 100 WBC
PLATELET # BLD AUTO: 325 K/UL (ref 150–400)
PMV BLD AUTO: 9.6 FL (ref 8.9–12.9)
POTASSIUM SERPL-SCNC: 3.9 MMOL/L (ref 3.5–5.1)
PROT SERPL-MCNC: 6.8 G/DL (ref 6.4–8.2)
RBC # BLD AUTO: 5.46 M/UL (ref 4.1–5.7)
RBC MORPH BLD: ABNORMAL
SERVICE CMNT-IMP: ABNORMAL
SODIUM SERPL-SCNC: 138 MMOL/L (ref 136–145)
TROPONIN I SERPL-MCNC: <0.04 NG/ML
WBC # BLD AUTO: 8.1 K/UL (ref 4.1–11.1)

## 2018-05-30 PROCEDURE — 84484 ASSAY OF TROPONIN QUANT: CPT | Performed by: EMERGENCY MEDICINE

## 2018-05-30 PROCEDURE — 93005 ELECTROCARDIOGRAM TRACING: CPT

## 2018-05-30 PROCEDURE — 99284 EMERGENCY DEPT VISIT MOD MDM: CPT

## 2018-05-30 PROCEDURE — 74011636320 HC RX REV CODE- 636/320: Performed by: EMERGENCY MEDICINE

## 2018-05-30 PROCEDURE — 83690 ASSAY OF LIPASE: CPT | Performed by: EMERGENCY MEDICINE

## 2018-05-30 PROCEDURE — 71275 CT ANGIOGRAPHY CHEST: CPT

## 2018-05-30 PROCEDURE — 36415 COLL VENOUS BLD VENIPUNCTURE: CPT | Performed by: EMERGENCY MEDICINE

## 2018-05-30 PROCEDURE — 82962 GLUCOSE BLOOD TEST: CPT

## 2018-05-30 PROCEDURE — 80053 COMPREHEN METABOLIC PANEL: CPT | Performed by: EMERGENCY MEDICINE

## 2018-05-30 PROCEDURE — 85025 COMPLETE CBC W/AUTO DIFF WBC: CPT | Performed by: EMERGENCY MEDICINE

## 2018-05-30 RX ORDER — SODIUM CHLORIDE 0.9 % (FLUSH) 0.9 %
10 SYRINGE (ML) INJECTION
Status: DISCONTINUED | OUTPATIENT
Start: 2018-05-30 | End: 2018-05-30 | Stop reason: HOSPADM

## 2018-05-30 RX ADMIN — IOPAMIDOL 100 ML: 755 INJECTION, SOLUTION INTRAVENOUS at 14:33

## 2018-05-30 NOTE — DISCHARGE INSTRUCTIONS
Chest Pain: Care Instructions  Your Care Instructions    There are many things that can cause chest pain. Some are not serious and will get better on their own in a few days. But some kinds of chest pain need more testing and treatment. Your doctor may have recommended a follow-up visit in the next 8 to 12 hours. If you are not getting better, you may need more tests or treatment. Even though your doctor has released you, you still need to watch for any problems. The doctor carefully checked you, but sometimes problems can develop later. If you have new symptoms or if your symptoms do not get better, get medical care right away. If you have worse or different chest pain or pressure that lasts more than 5 minutes or you passed out (lost consciousness), call 911 or seek other emergency help right away. A medical visit is only one step in your treatment. Even if you feel better, you still need to do what your doctor recommends, such as going to all suggested follow-up appointments and taking medicines exactly as directed. This will help you recover and help prevent future problems. How can you care for yourself at home? · Rest until you feel better. · Take your medicine exactly as prescribed. Call your doctor if you think you are having a problem with your medicine. · Do not drive after taking a prescription pain medicine. When should you call for help? Call 911 if:  ? · You passed out (lost consciousness). ? · You have severe difficulty breathing. ? · You have symptoms of a heart attack. These may include:  ¨ Chest pain or pressure, or a strange feeling in your chest.  ¨ Sweating. ¨ Shortness of breath. ¨ Nausea or vomiting. ¨ Pain, pressure, or a strange feeling in your back, neck, jaw, or upper belly or in one or both shoulders or arms. ¨ Lightheadedness or sudden weakness. ¨ A fast or irregular heartbeat.   After you call 911, the  may tell you to chew 1 adult-strength or 2 to 4 low-dose aspirin. Wait for an ambulance. Do not try to drive yourself. ?Call your doctor today if:  ? · You have any trouble breathing. ? · Your chest pain gets worse. ? · You are dizzy or lightheaded, or you feel like you may faint. ? · You are not getting better as expected. ? · You are having new or different chest pain. Where can you learn more? Go to http://dank-damien.info/. Enter A120 in the search box to learn more about \"Chest Pain: Care Instructions. \"  Current as of: March 20, 2017  Content Version: 11.4  © 5247-9605 [employer]. Care instructions adapted under license by [employer] (which disclaims liability or warranty for this information). If you have questions about a medical condition or this instruction, always ask your healthcare professional. [address] any warranty or liability for your use of this information.

## 2018-05-30 NOTE — ED NOTES
Patient here with c/o bilateral sharp chest pains. Patient states that he has had symptoms for 2 days, states that pain moved intermittently from side to side. Patient states hx of chest pain, reports that he had stents placed in 2014. Patient states that he has had chest pains in the past, stating \"Usually I come in and they don't find anything and they let me leave. If this EKG is normal I'm leaving! \"  Patient states that he also has hx of diabetes, states that two weeks ago he decided to stop taking all of his medications. Patient states he drove himself to the hospital today, however patient required urgent assistance from waiting room, loss of balance with near fall and was wheelchaired back to the room.

## 2018-05-30 NOTE — ED TRIAGE NOTES
Pt with a hx of diabetes and COPD presents to ED with c/o intermittent chest pain x2 days. Pt reports that the chest pain was on his right side 2 days ago and now it is on his left. Pt reports the pain as sharp. Pt reports no alleviating or aggravating factors. Pt reports feeling weak after walking from the parking lot. Pt reports having a stent placed in 2014 and 2015 he received another stent. Pt reports he has stopped taking his medications two weeks ago. Pt reports his cardiologist does not know he took himself off the medications. Pt reports he does not want any medications. Pt is alert and oriented. Pt skin is warm and dry. Pt is speaking in full sentences. Emergency Department Nursing Plan of Care       The Nursing Plan of Care is developed from the Nursing assessment and Emergency Department Attending provider initial evaluation. The plan of care may be reviewed in the ED Provider note.     The Plan of Care was developed with the following considerations:   Patient / Family readiness to learn indicated by:verbalized understanding  Persons(s) to be included in education: patient  Barriers to Learning/Limitations:No    Signed     Mee Arguelles    5/30/2018   12:46 PM

## 2018-05-30 NOTE — ED PROVIDER NOTES
EMERGENCY DEPARTMENT HISTORY AND PHYSICAL EXAM      Date: 5/30/2018  Patient Name: An Vagrhese    History of Presenting Illness     Chief Complaint   Patient presents with    Chest Pain       History Provided By: Patient    HPI: An Varghese, 76 y.o. male with PMHx significant for DM, DVT, and COPD, presents ambulatory to the ED with cc of intermittent episodes of sharp chest pain for the past day. Pt states he had an episode of right sided CP yesterday, and another episode of sharp left sided chest pain currently that onset prior to arrival. Pt states he has a history of similar episodes of chest pain. He has been non-compliant with all prescription medications for 2 weeks, including Xarelto. He denies any recent fever, or any alleviating/exacerbating factors. Pt does smoke cigarettes. There are no other complaints, changes, or physical findings at this time. PCP: Heraclio Armendariz. Lily Mora NP    Current Facility-Administered Medications   Medication Dose Route Frequency Provider Last Rate Last Dose    sodium chloride (NS) flush 10 mL  10 mL IntraVENous RAD ONCE Susana Hargrove, DO         Current Outpatient Prescriptions   Medication Sig Dispense Refill    empagliflozin (JARDIANCE) 25 mg tablet Take 1 Tab by mouth daily. 90 Tab 2    tiotropium (SPIRIVA) 18 mcg inhalation capsule Take 1 Cap by inhalation daily. 90 Cap 0    nitroglycerin (NITROSTAT) 0.4 mg SL tablet 1 Tab by SubLINGual route every five (5) minutes as needed for Chest Pain. 15 Tab 0    metFORMIN (GLUCOPHAGE) 500 mg tablet Take 1 Tab by mouth two (2) times daily (with meals). 180 Tab 0    atorvastatin (LIPITOR) 20 mg tablet Take 1 Tab by mouth nightly. 90 Tab 0    fluticasone (FLONASE) 50 mcg/actuation nasal spray 2 Sprays by Both Nostrils route daily. 1 Bottle 0    BLOOD-GLUCOSE METER (FREESTYLE LITE METER) by Does Not Apply route.  glucose blood VI test strips (ASCENSIA AUTODISC VI, ONE TOUCH ULTRA TEST VI) strip Freestyle.  Check sugar once daily fasting. E11.65 100 Strip 11    Lancets misc Use as directed. Dx: check sugar once daily. E11.65 freestyle 100 Each 11    aspirin delayed-release 81 mg tablet Take  by mouth daily.  fluticasone-salmeterol (ADVAIR) 100-50 mcg/dose diskus inhaler Take 1 Puff by inhalation every twelve (12) hours.  metoprolol tartrate (LOPRESSOR) 25 mg tablet Take 25 mg by mouth two (2) times a day.  albuterol (PROVENTIL HFA, VENTOLIN HFA, PROAIR HFA) 90 mcg/actuation inhaler Take 2 Puffs by inhalation.  [DISCONTINUED] rivaroxaban (XARELTO) 10 mg tablet Take 1 Tab by mouth two (2) times daily (with meals). Indications: PREVENTION OF PULMONARY THROMBOEMBOLISM RECURRENCE 60 Tab 12    acetaminophen (TYLENOL) 325 mg tablet Take 1 Tab by mouth every four (4) hours as needed for Pain. 80 Tab 0       Past History     Past Medical History:  Past Medical History:   Diagnosis Date    COPD     bronchitis    Diabetes ([facility] [address]     Personal history of DVT (deep vein thrombosis)        Past Surgical History:  Past Surgical History:   Procedure Laterality Date    CARDIAC SURG PROCEDURE UNLIST      HX CORONARY STENT PLACEMENT      twice, last 2015       Family History:  Family History   Problem Relation Age of Onset    Cancer Mother      breast    Heart Attack Mother        Social History:  Social History   Substance Use Topics    Smoking status: Current Every Day Smoker     Packs/day: 1.00    Smokeless tobacco: Never Used      Comment: less than  1 pack per day    Alcohol use No      Comment: stopped 1994       Allergies:  No Known Allergies      Review of Systems   Review of Systems   Constitutional: Negative for chills and fever. HENT: Negative for congestion and sore throat. Eyes: Negative for visual disturbance. Respiratory: Negative for cough and shortness of breath. Cardiovascular: Positive for chest pain. Negative for leg swelling.    Gastrointestinal: Negative for abdominal pain, blood in stool, diarrhea and nausea. Endocrine: Negative for polyuria. Genitourinary: Negative for dysuria and testicular pain. Musculoskeletal: Negative for arthralgias, joint swelling and myalgias. Skin: Negative for rash. Allergic/Immunologic: Negative for immunocompromised state. Neurological: Negative for weakness and headaches. Hematological: Does not bruise/bleed easily. Psychiatric/Behavioral: Negative for confusion. Physical Exam   Physical Exam   Constitutional: He is oriented to person, place, and time. He appears well-developed and well-nourished. HENT:   Head: Normocephalic and atraumatic. Moist mucous membranes   Eyes: Conjunctivae are normal. Pupils are equal, round, and reactive to light. Right eye exhibits no discharge. Left eye exhibits no discharge. Neck: Normal range of motion. Neck supple. No tracheal deviation present. Cardiovascular: Normal rate, regular rhythm and normal heart sounds. No murmur heard. Pulmonary/Chest: Effort normal and breath sounds normal. No respiratory distress. He has no wheezes. He has no rales. Abdominal: Soft. Bowel sounds are normal. There is no tenderness. There is no rebound and no guarding. Musculoskeletal: Normal range of motion. He exhibits no edema, tenderness or deformity. Neurological: He is alert and oriented to person, place, and time. Skin: Skin is warm and dry. No rash noted. No erythema. Psychiatric: His behavior is normal.   Nursing note and vitals reviewed.       Diagnostic Study Results     Labs -     Recent Results (from the past 12 hour(s))   GLUCOSE, POC    Collection Time: 05/30/18 12:58 PM   Result Value Ref Range    Glucose (POC) 246 (H) 65 - 100 mg/dL    Performed by Nicolas VIRK    CBC WITH AUTOMATED DIFF    Collection Time: 05/30/18  1:20 PM   Result Value Ref Range    WBC 8.1 4.1 - 11.1 K/uL    RBC 5.46 4.10 - 5.70 M/uL    HGB 16.1 12.1 - 17.0 g/dL    HCT 48.0 36.6 - 50.3 %    MCV 87.9 80.0 - 99.0 FL MCH 29.5 26.0 - 34.0 PG    MCHC 33.5 30.0 - 36.5 g/dL    RDW 13.5 11.5 - 14.5 %    PLATELET 040 806 - 875 K/uL    MPV 9.6 8.9 - 12.9 FL    NRBC 0.0 0  WBC    ABSOLUTE NRBC 0.00 0.00 - 0.01 K/uL    NEUTROPHILS 57 32 - 75 %    LYMPHOCYTES 28 12 - 49 %    MONOCYTES 10 5 - 13 %    EOSINOPHILS 3 0 - 7 %    BASOPHILS 1 0 - 1 %    IMMATURE GRANULOCYTES 1 (H) 0.0 - 0.5 %    ABS. NEUTROPHILS 4.6 1.8 - 8.0 K/UL    ABS. LYMPHOCYTES 2.3 0.8 - 3.5 K/UL    ABS. MONOCYTES 0.8 0.0 - 1.0 K/UL    ABS. EOSINOPHILS 0.2 0.0 - 0.4 K/UL    ABS. BASOPHILS 0.1 0.0 - 0.1 K/UL    ABS. IMM. GRANS. 0.1 (H) 0.00 - 0.04 K/UL    DF SMEAR SCANNED      RBC COMMENTS NORMOCYTIC, NORMOCHROMIC     METABOLIC PANEL, COMPREHENSIVE    Collection Time: 05/30/18  1:20 PM   Result Value Ref Range    Sodium 138 136 - 145 mmol/L    Potassium 3.9 3.5 - 5.1 mmol/L    Chloride 103 97 - 108 mmol/L    CO2 28 21 - 32 mmol/L    Anion gap 7 5 - 15 mmol/L    Glucose 241 (H) 65 - 100 mg/dL    BUN 15 6 - 20 MG/DL    Creatinine 1.12 0.70 - 1.30 MG/DL    BUN/Creatinine ratio 13 12 - 20      GFR est AA >60 >60 ml/min/1.73m2    GFR est non-AA >60 >60 ml/min/1.73m2    Calcium 10.3 (H) 8.5 - 10.1 MG/DL    Bilirubin, total 0.5 0.2 - 1.0 MG/DL    ALT (SGPT) 22 12 - 78 U/L    AST (SGOT) 18 15 - 37 U/L    Alk. phosphatase 115 45 - 117 U/L    Protein, total 6.8 6.4 - 8.2 g/dL    Albumin 3.4 (L) 3.5 - 5.0 g/dL    Globulin 3.4 2.0 - 4.0 g/dL    A-G Ratio 1.0 (L) 1.1 - 2.2     LIPASE    Collection Time: 05/30/18  1:20 PM   Result Value Ref Range    Lipase 265 73 - 393 U/L   TROPONIN I    Collection Time: 05/30/18  1:20 PM   Result Value Ref Range    Troponin-I, Qt. <0.04 <0.05 ng/mL       Radiologic Studies -   CTA CHEST W OR W WO CONT   Final Result        CT Results  (Last 48 hours)               05/30/18 1433  CTA CHEST W OR W WO CONT Final result    Impression:  IMPRESSION:    1. No pulmonary embolus. 2. Atherosclerosis with left coronary stent.    3. Old granulomatous disease. Narrative:  EXAM: CT ANGIOGRAPHY CHEST    INDICATION: Chest pain, acute, pulmonary aneurysm (PE) suspected. COMPARISON: None. CONTRAST: 100 mL of Isovue-370. TECHNIQUE:    Precontrast  images were obtained to localize the volume for acquisition. Multislice helical CT arteriography was performed from the diaphragm to the   thoracic inlet during uneventful rapid bolus intravenous contrast   administration. Lung and soft tissue windows were generated. Coronal and   sagittal  reformatted images were also generated and 3D post processing   consisting of coronal maximum intensity projection images was performed. CT dose   reduction was achieved through use of a standardized protocol tailored for this   examination and automatic exposure control for dose modulation. FINDINGS:   LOWER NECK: The visualized portions of the thyroid and structures of the lower   neck are within normal limits. LUNGS: The lungs are clear of mass, nodule, airspace disease or edema. PLEURA: There is no pleural effusion or pneumothorax. PULMONARY ARTERIES: The pulmonary arteries are well enhanced and no pulmonary   emboli are identified. AORTA: The aorta is minimally atherosclerotic and enhances normally without   evidence of aneurysm or dissection. There is a left coronary stent. MEDIASTINUM: There is no mediastinal or hilar adenopathy or mass. There are   small calcified hilar lymph nodes. BONES AND SOFT TISSUES: The bones and soft tissues of the chest wall are within   normal limits. UPPER ABDOMEN: The visualized portions of the upper abdominal organs are normal.               CXR Results  (Last 48 hours)    None            Medical Decision Making   I am the first provider for this patient. I reviewed the vital signs, available nursing notes, past medical history, past surgical history, family history and social history. Vital Signs-Reviewed the patient's vital signs.   Patient Vitals for the past 12 hrs:   Temp Pulse Resp BP SpO2   05/30/18 1523 97.5 °F (36.4 °C) 72 18 128/74 93 %   05/30/18 1400 - 73 18 124/56 93 %   05/30/18 1300 - 81 21 122/63 92 %   05/30/18 1242 97.6 °F (36.4 °C) 82 16 144/76 92 %       Pulse Oximetry Analysis - 93% on room air    Cardiac Monitor:   Rate: 76 bpm  Rhythm: Normal Sinus Rhythm      EKG interpretation: (Preliminary) 12:38 PM  Rhythm: normal sinus rhythm; and regular . Rate (approx.): 76; Axis: normal; P wave: normal; QRS interval: normal ; ST/T wave: no ST elevation, no ST depression;   ME interval 164 ms,  ms,  ms, QTc 425 ms. Written by Krzysztof Whittington. [facility] ED Scribe, as dictated by Bi Owusu DO. Records Reviewed: Nursing Notes and Old Medical Records    Provider Notes (Medical Decision Making):   Patient presents with CP. DDx:  ACS, Aortic dissection, PNA, PE, PTX, pericarditis, myocarditis, GERD, costochondritis, anxiety. Most likely pt's chronic chest pain given the HPI and Physical exam. Will obtain labs, CXR, EKG. Pt has had multiple similar previous episode of theses symptoms, and has been evaluated by multiple specialists with no etiology. Given that he stopped anticoagulation will obtain a CT, and if work up is normal discharge for outpatient follow up. - I have re-examined the patient and the patient denies chest pain on re-examination. The patient has had onset of chest pain greater than 8 hours and one negative set of cardiac enzymes or 2 negative sets of cardiac enzymes in the ER during this visit. The diagnosis, follow up, return instructions, test results, x-rays and medications have been discussed and reviewed with the patient. The patient has been given the opportunity to ask questions. The patient  expresses understanding of the diagnosis, follow-up and return instructions. The patient agrees to follow up with primary care or cardiology as directed and to return immediately if the chest pain worsens.   The patient expresses understanding that although cardiac testing at this time is negative, a cardiac problem could still be present and that a follow-up appointment for further evaluation and risk factor modification is necessary to complete the evaluation of this complaint. ED Course:   Initial assessment performed. The patients presenting problems have been discussed, and they are in agreement with the care plan formulated and outlined with them. I have encouraged them to ask questions as they arise throughout their visit. Disposition:  DISCHARGE NOTE  3:24 PM  The patient has been re-evaluated and is ready for discharge. Reviewed available results with patient. Counseled pt on diagnosis and care plan. Pt has expressed understanding, and all questions have been answered. Pt agrees with plan and agrees to follow up as recommended, or return to the ED if their symptoms worsen. Discharge instructions have been provided and explained to the pt, along with reasons to return to the ED. PLAN:  1. Discharge Medication List as of 5/30/2018  3:14 PM        2. Follow-up Information     Follow up With Details Comments Contact Info    Marilou Lennon NP Call in 1 day  [address] EMERGENCY DEPT  If symptoms worsen [facility]  [phone]        Return to ED if worse     Diagnosis     Clinical Impression:   1. Chest pain, unspecified type        Attestations: This note is prepared by Yaima Stephen. Conrado Maxwell, acting as Scribe for Sergey Strong DO. Sergey Strong DO: The scribe's documentation has been prepared under my direction and personally reviewed by me in its entirety. I confirm that the note above accurately reflects all work, treatment, procedures, and medical decision making performed by me.

## 2018-05-31 LAB
ATRIAL RATE: 76 BPM
CALCULATED P AXIS, ECG09: 54 DEGREES
CALCULATED R AXIS, ECG10: 70 DEGREES
CALCULATED T AXIS, ECG11: 63 DEGREES
DIAGNOSIS, 93000: NORMAL
P-R INTERVAL, ECG05: 164 MS
Q-T INTERVAL, ECG07: 378 MS
QRS DURATION, ECG06: 102 MS
QTC CALCULATION (BEZET), ECG08: 425 MS
VENTRICULAR RATE, ECG03: 76 BPM

## 2018-06-06 ENCOUNTER — PATIENT OUTREACH (OUTPATIENT)
Dept: INTERNAL MEDICINE CLINIC | Age: 68
End: 2018-06-06

## 2018-08-26 ENCOUNTER — HOSPITAL ENCOUNTER (EMERGENCY)
Age: 68
Discharge: HOME | End: 2018-08-26
Attending: EMERGENCY MEDICINE
Payer: MEDICARE

## 2018-08-26 ENCOUNTER — APPOINTMENT (OUTPATIENT)
Dept: GENERAL RADIOLOGY | Age: 68
End: 2018-08-26
Attending: EMERGENCY MEDICINE
Payer: MEDICARE

## 2018-08-26 VITALS
BODY MASS INDEX: 31.84 KG/M2 | DIASTOLIC BLOOD PRESSURE: 69 MMHG | HEART RATE: 103 BPM | TEMPERATURE: 98.3 F | OXYGEN SATURATION: 93 % | WEIGHT: 215 LBS | HEIGHT: 69 IN | RESPIRATION RATE: 32 BRPM | SYSTOLIC BLOOD PRESSURE: 147 MMHG

## 2018-08-26 DIAGNOSIS — R05.9 COUGH: Primary | ICD-10-CM

## 2018-08-26 DIAGNOSIS — Z91.14 NON COMPLIANCE W MEDICATION REGIMEN: ICD-10-CM

## 2018-08-26 LAB
ALBUMIN SERPL-MCNC: 3.7 G/DL (ref 3.5–5)
ALBUMIN/GLOB SERPL: 0.9 {RATIO} (ref 1.1–2.2)
ALP SERPL-CCNC: 136 U/L (ref 45–117)
ALT SERPL-CCNC: 23 U/L (ref 12–78)
ANION GAP SERPL CALC-SCNC: 5 MMOL/L (ref 5–15)
AST SERPL-CCNC: 16 U/L (ref 15–37)
BASOPHILS # BLD: 0.1 K/UL (ref 0–0.1)
BASOPHILS NFR BLD: 1 % (ref 0–1)
BILIRUB SERPL-MCNC: 1 MG/DL (ref 0.2–1)
BUN SERPL-MCNC: 14 MG/DL (ref 6–20)
BUN/CREAT SERPL: 14 (ref 12–20)
CALCIUM SERPL-MCNC: 10.8 MG/DL (ref 8.5–10.1)
CHLORIDE SERPL-SCNC: 104 MMOL/L (ref 97–108)
CO2 SERPL-SCNC: 28 MMOL/L (ref 21–32)
CREAT SERPL-MCNC: 0.99 MG/DL (ref 0.7–1.3)
DIFFERENTIAL METHOD BLD: ABNORMAL
EOSINOPHIL # BLD: 0.3 K/UL (ref 0–0.4)
EOSINOPHIL NFR BLD: 2 % (ref 0–7)
ERYTHROCYTE [DISTWIDTH] IN BLOOD BY AUTOMATED COUNT: 13.5 % (ref 11.5–14.5)
GLOBULIN SER CALC-MCNC: 4.2 G/DL (ref 2–4)
GLUCOSE SERPL-MCNC: 130 MG/DL (ref 65–100)
HCT VFR BLD AUTO: 50.9 % (ref 36.6–50.3)
HGB BLD-MCNC: 16.7 G/DL (ref 12.1–17)
IMM GRANULOCYTES # BLD: 0.1 K/UL (ref 0–0.04)
IMM GRANULOCYTES NFR BLD AUTO: 1 % (ref 0–0.5)
LYMPHOCYTES # BLD: 1.4 K/UL (ref 0.8–3.5)
LYMPHOCYTES NFR BLD: 11 % (ref 12–49)
MCH RBC QN AUTO: 29.5 PG (ref 26–34)
MCHC RBC AUTO-ENTMCNC: 32.8 G/DL (ref 30–36.5)
MCV RBC AUTO: 89.8 FL (ref 80–99)
MONOCYTES # BLD: 0.8 K/UL (ref 0–1)
MONOCYTES NFR BLD: 7 % (ref 5–13)
NEUTS SEG # BLD: 9.6 K/UL (ref 1.8–8)
NEUTS SEG NFR BLD: 79 % (ref 32–75)
NRBC # BLD: 0 K/UL (ref 0–0.01)
NRBC BLD-RTO: 0 PER 100 WBC
PLATELET # BLD AUTO: 333 K/UL (ref 150–400)
PMV BLD AUTO: 9.7 FL (ref 8.9–12.9)
POTASSIUM SERPL-SCNC: 4.1 MMOL/L (ref 3.5–5.1)
PROT SERPL-MCNC: 7.9 G/DL (ref 6.4–8.2)
RBC # BLD AUTO: 5.67 M/UL (ref 4.1–5.7)
SODIUM SERPL-SCNC: 137 MMOL/L (ref 136–145)
WBC # BLD AUTO: 12.2 K/UL (ref 4.1–11.1)

## 2018-08-26 PROCEDURE — 77030029684 HC NEB SM VOL KT MONA -A

## 2018-08-26 PROCEDURE — 71045 X-RAY EXAM CHEST 1 VIEW: CPT

## 2018-08-26 PROCEDURE — 85025 COMPLETE CBC W/AUTO DIFF WBC: CPT | Performed by: EMERGENCY MEDICINE

## 2018-08-26 PROCEDURE — 99282 EMERGENCY DEPT VISIT SF MDM: CPT

## 2018-08-26 PROCEDURE — 80053 COMPREHEN METABOLIC PANEL: CPT | Performed by: EMERGENCY MEDICINE

## 2018-08-26 PROCEDURE — 36415 COLL VENOUS BLD VENIPUNCTURE: CPT | Performed by: EMERGENCY MEDICINE

## 2018-08-26 PROCEDURE — 74011000250 HC RX REV CODE- 250: Performed by: EMERGENCY MEDICINE

## 2018-08-26 PROCEDURE — 94640 AIRWAY INHALATION TREATMENT: CPT

## 2018-08-26 RX ORDER — IPRATROPIUM BROMIDE AND ALBUTEROL SULFATE 2.5; .5 MG/3ML; MG/3ML
3 SOLUTION RESPIRATORY (INHALATION) ONCE
Status: COMPLETED | OUTPATIENT
Start: 2018-08-26 | End: 2018-08-26

## 2018-08-26 RX ADMIN — IPRATROPIUM BROMIDE AND ALBUTEROL SULFATE 3 ML: .5; 3 SOLUTION RESPIRATORY (INHALATION) at 12:35

## 2018-08-26 NOTE — DISCHARGE INSTRUCTIONS
Cough: Care Instructions  Your Care Instructions    A cough is your body's response to something that bothers your throat or airways. Many things can cause a cough. You might cough because of a cold or the flu, bronchitis, or asthma. Smoking, postnasal drip, allergies, and stomach acid that backs up into your throat also can cause coughs. A cough is a symptom, not a disease. Most coughs stop when the cause, such as a cold, goes away. You can take a few steps at home to cough less and feel better. Follow-up care is a key part of your treatment and safety. Be sure to make and go to all appointments, and call your doctor if you are having problems. It's also a good idea to know your test results and keep a list of the medicines you take. How can you care for yourself at home? · Drink lots of water and other fluids. This helps thin the mucus and soothes a dry or sore throat. Honey or lemon juice in hot water or tea may ease a dry cough. · Take cough medicine as directed by your doctor. · Prop up your head on pillows to help you breathe and ease a dry cough. · Try cough drops to soothe a dry or sore throat. Cough drops don't stop a cough. Medicine-flavored cough drops are no better than candy-flavored drops or hard candy. · Do not smoke. Avoid secondhand smoke. If you need help quitting, talk to your doctor about stop-smoking programs and medicines. These can increase your chances of quitting for good. When should you call for help? Call 911 anytime you think you may need emergency care.  For example, call if:    · You have severe trouble breathing.    Call your doctor now or seek immediate medical care if:    · You cough up blood.     · You have new or worse trouble breathing.     · You have a new or higher fever.     · You have a new rash.    Watch closely for changes in your health, and be sure to contact your doctor if:    · You cough more deeply or more often, especially if you notice more mucus or a change in the color of your mucus.     · You have new symptoms, such as a sore throat, an earache, or sinus pain.     · You do not get better as expected. Where can you learn more? Go to http://dank-damien.info/. Enter D279 in the search box to learn more about \"Cough: Care Instructions. \"  Current as of: December 6, 2017  Content Version: 11.7  © 7859-8150 [employer] Care instructions adapted under license by [employer] (which disclaims liability or warranty for this information). If you have questions about a medical condition or this instruction, always ask your healthcare professional. [address] any warranty or liability for your use of this information.

## 2018-08-26 NOTE — ED NOTES
Pt for DC home and sts he feel much better compared to arrival. Pt accepted DC data and f/u. Patient (s)  given copy of dc instructions and 0 script(s). Patient (s)  verbalized understanding of instructions and script (s). Patient given a current medication reconciliation form and verbalized understanding of their medications. Patient (s) verbalized understanding of the importance of discussing medications with  his or her physician or clinic they will be following up with. Patient alert and oriented and in no acute distress. Patient discharged home ambulatory with self.

## 2018-08-26 NOTE — ED PROVIDER NOTES
EMERGENCY DEPARTMENT HISTORY AND PHYSICAL EXAM      Date: 8/26/2018  Patient Name: Deb Flower    History of Presenting Illness     Chief Complaint   Patient presents with    Cough     Patient reports cough and night sweats, but denies hx of TB but reports always having a positive TB test.  He states \"I dont recall ever havig TB\"       History Provided By: Patient    HPI: Deb Flower, 76 y.o. male with PMHx significant for COPD, DM, bronchitis, and DM, presents ambulatory to the ED with cc of worsening spasmodic cough with clear sputum production x 2 weeks. Pt notes that his cough is exacerbated with lying flat. Pt denies any associated sxs, nor any alleviating factors. Pt notes that he used his inhaler with no relief of his sxs. Pt explains that he has been having a progressing cough for the past 2 weeks despite the use of his inhaler. Pt reports that he has had positive TB tests in the past, but he states that he has never been diagnosed with TB. Pt notes that he has a Hx of a clotting disorder and DVT for which he is supposed to be taking Xarelto, but he reports that he has not taken the medication in several weeks. Pt states that he stopped taking all of his prescribed medication 2 months ago because he said \" to h*ll with it\". Pt denies Hx of PE. Pt endorses a social Hx of tobacco use (1.5 ppd). Pt specifically denies SOB, hemoptysis, fever, abdominal pain, N/V/D, HA, SOB, or leg swelling. There are no other complaints, changes, or physical findings at this time. PCP: Elias Guerrier. Nicole García NP    Current Outpatient Prescriptions   Medication Sig Dispense Refill    [DISCONTINUED] rivaroxaban (XARELTO) 10 mg tablet Take 1 Tab by mouth two (2) times daily (with meals). Indications: PREVENTION OF PULMONARY THROMBOEMBOLISM RECURRENCE 60 Tab 12    empagliflozin (JARDIANCE) 25 mg tablet Take 1 Tab by mouth daily. 90 Tab 2    tiotropium (SPIRIVA) 18 mcg inhalation capsule Take 1 Cap by inhalation daily.  80 Cap 0    nitroglycerin (NITROSTAT) 0.4 mg SL tablet 1 Tab by SubLINGual route every five (5) minutes as needed for Chest Pain. 15 Tab 0    metFORMIN (GLUCOPHAGE) 500 mg tablet Take 1 Tab by mouth two (2) times daily (with meals). 180 Tab 0    atorvastatin (LIPITOR) 20 mg tablet Take 1 Tab by mouth nightly. 90 Tab 0    acetaminophen (TYLENOL) 325 mg tablet Take 1 Tab by mouth every four (4) hours as needed for Pain. 90 Tab 0    fluticasone (FLONASE) 50 mcg/actuation nasal spray 2 Sprays by Both Nostrils route daily. 1 Bottle 0    BLOOD-GLUCOSE METER (FREESTYLE LITE METER) by Does Not Apply route.  glucose blood VI test strips (ASCENSIA AUTODISC VI, ONE TOUCH ULTRA TEST VI) strip Freestyle. Check sugar once daily fasting. E11.65 100 Strip 11    Lancets misc Use as directed. Dx: check sugar once daily. E11.65 freestyle 100 Each 11    aspirin delayed-release 81 mg tablet Take  by mouth daily.  fluticasone-salmeterol (ADVAIR) 100-50 mcg/dose diskus inhaler Take 1 Puff by inhalation every twelve (12) hours.  metoprolol tartrate (LOPRESSOR) 25 mg tablet Take 25 mg by mouth two (2) times a day.  albuterol (PROVENTIL HFA, VENTOLIN HFA, PROAIR HFA) 90 mcg/actuation inhaler Take 2 Puffs by inhalation.          Past History     Past Medical History:  Past Medical History:   Diagnosis Date    COPD     bronchitis    Diabetes ([address]     Personal history of DVT (deep vein thrombosis)        Past Surgical History:  Past Surgical History:   Procedure Laterality Date    CARDIAC SURG PROCEDURE UNLIST      HX CORONARY STENT PLACEMENT      twice, last 2015       Family History:  Family History   Problem Relation Age of Onset    Cancer Mother      breast    Heart Attack Mother        Social History:  Social History   Substance Use Topics    Smoking status: Current Every Day Smoker     Packs/day: 1.00    Smokeless tobacco: Never Used      Comment: less than  1 pack per day    Alcohol use No      Comment: stopped 1994       Allergies:  No Known Allergies      Review of Systems   Review of Systems   Constitutional: Negative for chills and fever. HENT: Negative for congestion, rhinorrhea and sore throat. Respiratory: Positive for cough. Negative for shortness of breath. Cardiovascular: Negative for chest pain. Gastrointestinal: Negative for abdominal pain, nausea and vomiting. Genitourinary: Negative for dysuria and urgency. Skin: Negative for rash. Neurological: Negative for dizziness, light-headedness and headaches. All other systems reviewed and are negative. Physical Exam   Physical Exam   Constitutional: He is oriented to person, place, and time. He appears well-developed and well-nourished. No distress. HENT:   Head: Normocephalic and atraumatic. Eyes: Conjunctivae and EOM are normal. Pupils are equal, round, and reactive to light. Neck: Normal range of motion. Cardiovascular: Normal rate, regular rhythm and intact distal pulses. Pulmonary/Chest: Effort normal. No stridor. No respiratory distress. He has no wheezes. Spasmodic cough which sounds junky, lungs are clear but slightly diminshed at the bases   Abdominal: Soft. He exhibits no distension. There is no tenderness. Musculoskeletal: Normal range of motion. Neurological: He is alert and oriented to person, place, and time. Skin: Skin is warm and dry. Psychiatric: He has a normal mood and affect. Nursing note and vitals reviewed.       Diagnostic Study Results     Labs -     Recent Results (from the past 12 hour(s))   CBC WITH AUTOMATED DIFF    Collection Time: 08/26/18 11:32 AM   Result Value Ref Range    WBC 12.2 (H) 4.1 - 11.1 K/uL    RBC 5.67 4.10 - 5.70 M/uL    HGB 16.7 12.1 - 17.0 g/dL    HCT 50.9 (H) 36.6 - 50.3 %    MCV 89.8 80.0 - 99.0 FL    MCH 29.5 26.0 - 34.0 PG    MCHC 32.8 30.0 - 36.5 g/dL    RDW 13.5 11.5 - 14.5 %    PLATELET 937 124 - 883 K/uL    MPV 9.7 8.9 - 12.9 FL    NRBC 0.0 0  WBC ABSOLUTE NRBC 0.00 0.00 - 0.01 K/uL    NEUTROPHILS 79 (H) 32 - 75 %    LYMPHOCYTES 11 (L) 12 - 49 %    MONOCYTES 7 5 - 13 %    EOSINOPHILS 2 0 - 7 %    BASOPHILS 1 0 - 1 %    IMMATURE GRANULOCYTES 1 (H) 0.0 - 0.5 %    ABS. NEUTROPHILS 9.6 (H) 1.8 - 8.0 K/UL    ABS. LYMPHOCYTES 1.4 0.8 - 3.5 K/UL    ABS. MONOCYTES 0.8 0.0 - 1.0 K/UL    ABS. EOSINOPHILS 0.3 0.0 - 0.4 K/UL    ABS. BASOPHILS 0.1 0.0 - 0.1 K/UL    ABS. IMM. GRANS. 0.1 (H) 0.00 - 0.04 K/UL    DF AUTOMATED     METABOLIC PANEL, COMPREHENSIVE    Collection Time: 08/26/18 11:32 AM   Result Value Ref Range    Sodium 137 136 - 145 mmol/L    Potassium 4.1 3.5 - 5.1 mmol/L    Chloride 104 97 - 108 mmol/L    CO2 28 21 - 32 mmol/L    Anion gap 5 5 - 15 mmol/L    Glucose 130 (H) 65 - 100 mg/dL    BUN 14 6 - 20 MG/DL    Creatinine 0.99 0.70 - 1.30 MG/DL    BUN/Creatinine ratio 14 12 - 20      GFR est AA >60 >60 ml/min/1.73m2    GFR est non-AA >60 >60 ml/min/1.73m2    Calcium 10.8 (H) 8.5 - 10.1 MG/DL    Bilirubin, total 1.0 0.2 - 1.0 MG/DL    ALT (SGPT) 23 12 - 78 U/L    AST (SGOT) 16 15 - 37 U/L    Alk. phosphatase 136 (H) 45 - 117 U/L    Protein, total 7.9 6.4 - 8.2 g/dL    Albumin 3.7 3.5 - 5.0 g/dL    Globulin 4.2 (H) 2.0 - 4.0 g/dL    A-G Ratio 0.9 (L) 1.1 - 2.2         Radiologic Studies -     CXR Results  (Last 48 hours)               08/26/18 1143  XR CHEST PORT Final result    Impression:  IMPRESSION:   No acute finding       Narrative:  INDICATION: Cough and shortness of breath       COMPARISON: 4/15/2018       A single frontal view was obtained. The time of this study is 1139 hours. Lungs   are clear. The heart and mediastinal shadows are normal.                            Medical Decision Making   I am the first provider for this patient. I reviewed the vital signs, available nursing notes, past medical history, past surgical history, family history and social history. Vital Signs-Reviewed the patient's vital signs.   Patient Vitals for the past 12 hrs:   Temp Pulse Resp BP SpO2   08/26/18 1059 98.3 °F (36.8 °C) (!) 103 (!) 32 147/69 93 %       Pulse Oximetry Analysis - 93% on RA    Records Reviewed: Nursing Notes, Old Medical Records, Previous Radiology Studies and Previous Laboratory Studies    Provider Notes (Medical Decision Making):   DDx: viral infection, PNA, COPD exacerbation, PE. Unlikely PE given his cc is cough, he has no hemoptysis, and he is not hypoxic. Suspect most likely viral URI or pneumonia. Patient did tell triage nurse that he has had positive TB skin tests previously, but never been diagnosed with TB. Will maintain airborne precautions until CXR obtained, however have a low suspicion for TB at this time. ED Course:   Initial assessment performed. The patients presenting problems have been discussed, and they are in agreement with the care plan formulated and outlined with them. I have encouraged them to ask questions as they arise throughout their visit. CXR with no evidence of pneumonia, no upper lobe lesion therefore doubt TB. PROGRESS NOTE:  1:22 PM  Pt now has better air movement through all lung fields. Pt has no wheezing, and he reports that he feels improved. Cough has improved with neb. RR is normal. Pt is stable for discharge. Written by Alirio Hatfield ED Scribe, as dictated by Michelle Delaney MD.    Critical Care Time:   0    Disposition:  1:22 PM  The patient has been re-evaluated and is ready for discharge. Reviewed available results with patient. Counseled patient on diagnosis and care plan. Patient has expressed understanding, and all questions have been answered. Patient agrees with plan and agrees to follow up as recommended, or return to the ED if their symptoms worsen. Discharge instructions have been provided and explained to the patient, along with reasons to return to the ED. PLAN:  1. Follow-up Information     Follow up With Details Comments Contact Info    Marilou Dupont NP Schedule an appointment as soon as possible for a visit in 2 days  [place]  [address]      [facility] EMERGENCY DEPT  As needed, If symptoms worsen [address]  [phone]        Return to ED if worse     Diagnosis     Clinical Impression:   1. Cough    2. Non compliance w medication regimen        Attestations: This note is prepared by Gino Ortiz, acting as Scribe for Federico Mccain MD.    Federico Mccain MD: The scribe's documentation has been prepared under my direction and personally reviewed by me in its entirety. I confirm that the note above accurately reflects all work, treatment, procedures, and medical decision making performed by me.

## 2018-08-26 NOTE — ED TRIAGE NOTES
Patient reports that he has stopped taking all of his home medications including Oliver Musty for a hx of blood clots. Patient presents coughing uncontrollably. I have placed him in a negative pressure room and notified the provider and nurse caring for the patient.

## 2018-11-07 ENCOUNTER — HOSPITAL ENCOUNTER (EMERGENCY)
Age: 68
Discharge: HOME | End: 2018-11-07
Attending: EMERGENCY MEDICINE
Payer: MEDICARE

## 2018-11-07 VITALS
BODY MASS INDEX: 29.92 KG/M2 | SYSTOLIC BLOOD PRESSURE: 119 MMHG | WEIGHT: 202 LBS | TEMPERATURE: 97.8 F | HEIGHT: 69 IN | DIASTOLIC BLOOD PRESSURE: 76 MMHG | HEART RATE: 82 BPM | RESPIRATION RATE: 16 BRPM | OXYGEN SATURATION: 96 %

## 2018-11-07 DIAGNOSIS — H11.32 SUBCONJUNCTIVAL HEMORRHAGE OF LEFT EYE: ICD-10-CM

## 2018-11-07 DIAGNOSIS — S05.92XA LEFT EYE INJURY, INITIAL ENCOUNTER: Primary | ICD-10-CM

## 2018-11-07 PROCEDURE — 99283 EMERGENCY DEPT VISIT LOW MDM: CPT

## 2018-11-07 NOTE — ED NOTES
Attempt to discuss discharge information with patient unsuccessful due to patient stating he had \"to leave\". Patient provided discharge information

## 2018-11-07 NOTE — DISCHARGE INSTRUCTIONS
Subconjunctival Hemorrhage: Care Instructions  Your Care Instructions    Sometimes small blood vessels in the white of the eye can break, causing a red spot or speck. This is called a subconjunctival hemorrhage. The blood vessels may break when you sneeze, cough, vomit, strain, or bend over. Sometimes there is no clear cause. The blood may look alarming, especially if the spot is large. If there is no pain or vision change, there is usually no reason to worry, and the blood slowly will go away on its own in 2 to 3 weeks. Follow-up care is a key part of your treatment and safety. Be sure to make and go to all appointments, and call your doctor if you are having problems. It's also a good idea to know your test results and keep a list of the medicines you take. How can you care for yourself at home? · Watch for changes in your eye. It is normal for the red spot on your eyeball to change color as it heals. Just like a bruise on your skin, it may change from red to brown to purple to yellow. · Do not take aspirin or products that contain aspirin, which can increase bleeding. Use acetaminophen (Tylenol) if you need pain relief for another problem. · Do not take two or more pain medicines at the same time unless the doctor told you to. Many pain medicines have acetaminophen, which is Tylenol. Too much acetaminophen (Tylenol) can be harmful. When should you call for help? Call your doctor now or seek immediate medical care if:    · You have signs of an eye infection, such as:  ? Pus or thick discharge coming from the eye.  ? Redness or swelling around the eye.  ? A fever.     · You see blood over the black part of your eye (pupil).     · You have any changes or problems in your vision.     · You have any pain in your eye.    Watch closely for changes in your health, and be sure to contact your doctor if:    · You do not get better as expected. Where can you learn more?   Go to http://dank-damien.info/. Enter I346 in the search box to learn more about \"Subconjunctival Hemorrhage: Care Instructions. \"  Current as of: December 3, 2017  Content Version: 11.8  © 8228-3097 [employer], [employer]. Care instructions adapted under license by [employer] (which disclaims liability or warranty for this information). If you have questions about a medical condition or this instruction, always ask your healthcare professional. [address] any warranty or liability for your use of this information.

## 2018-11-07 NOTE — ED PROVIDER NOTES
EMERGENCY DEPARTMENT HISTORY AND PHYSICAL EXAM 
 
Date: 11/7/2018 Patient Name: Alex Olmstead History of Presenting Illness Chief Complaint Patient presents with  Eye Pain History Provided By: Patient HPI: Alex Olmstead is a 76 y.o. male with a PMH of COPD, DM, DVT, CAD with stent placement who presents with L eye pain after he states he ran into a piece of glass yesterday evening. Pt states it knocked his glasses off but denies LOC. Pt states he came in because his eye is so red now. Pt denies any discharge or changes in vision. Pt rates pain 3/10. PCP: Angela Higgins., NP Current Outpatient Medications Medication Sig Dispense Refill  empagliflozin (JARDIANCE) 25 mg tablet Take 1 Tab by mouth daily. 90 Tab 2  
 tiotropium (SPIRIVA) 18 mcg inhalation capsule Take 1 Cap by inhalation daily. 90 Cap 0  
 nitroglycerin (NITROSTAT) 0.4 mg SL tablet 1 Tab by SubLINGual route every five (5) minutes as needed for Chest Pain. 15 Tab 0  
 metFORMIN (GLUCOPHAGE) 500 mg tablet Take 1 Tab by mouth two (2) times daily (with meals). 180 Tab 0  
 atorvastatin (LIPITOR) 20 mg tablet Take 1 Tab by mouth nightly. 90 Tab 0  
 acetaminophen (TYLENOL) 325 mg tablet Take 1 Tab by mouth every four (4) hours as needed for Pain. 90 Tab 0  
 fluticasone (FLONASE) 50 mcg/actuation nasal spray 2 Sprays by Both Nostrils route daily. 1 Bottle 0  
 BLOOD-GLUCOSE METER (FREESTYLE LITE METER) by Does Not Apply route.  glucose blood VI test strips (ASCENSIA AUTODISC VI, ONE TOUCH ULTRA TEST VI) strip Freestyle. Check sugar once daily fasting. E11.65 100 Strip 11  Lancets misc Use as directed. Dx: check sugar once daily. E11.65 freestyle 100 Each 11  
 aspirin delayed-release 81 mg tablet Take  by mouth daily.  fluticasone-salmeterol (ADVAIR) 100-50 mcg/dose diskus inhaler Take 1 Puff by inhalation every twelve (12) hours.  metoprolol tartrate (LOPRESSOR) 25 mg tablet Take 25 mg by mouth two (2) times a day.  albuterol (PROVENTIL HFA, VENTOLIN HFA, PROAIR HFA) 90 mcg/actuation inhaler Take 2 Puffs by inhalation. Past History Past Medical History: 
Past Medical History:  
Diagnosis Date  COPD   
 bronchitis  Diabetes ([address]  Personal history of DVT (deep vein thrombosis) Past Surgical History: 
Past Surgical History:  
Procedure Laterality Date  CARDIAC SURG PROCEDURE UNLIST  HX CORONARY STENT PLACEMENT    
 twice, last 2015 Family History: 
Family History Problem Relation Age of Onset  Cancer Mother   
     breast  
 Heart Attack Mother Social History: 
Social History Tobacco Use  Smoking status: Current Every Day Smoker Packs/day: 1.00  Smokeless tobacco: Never Used  Tobacco comment: less than  1 pack per day Substance Use Topics  Alcohol use: No  
  Comment: stopped 1994  Drug use: No  
 
 
Allergies: 
No Known Allergies Review of Systems Review of Systems Eyes: Positive for pain and redness. Negative for photophobia, discharge, itching and visual disturbance. Allergic/Immunologic: Positive for immunocompromised state. Neurological: Negative for speech difficulty and weakness. All other systems reviewed and are negative. Physical Exam  
 
Vitals:  
 11/07/18 1136 BP: 119/76 Pulse: 82 Resp: 16 Temp: 97.8 °F (36.6 °C) SpO2: 96% Weight: 91.6 kg (202 lb) Height: 5' 9\" (1.753 m) Physical Exam  
Constitutional: He is oriented to person, place, and time. He appears well-developed and well-nourished. No distress. HENT:  
Head: Normocephalic and atraumatic. Mouth/Throat: Oropharynx is clear and moist. No oropharyngeal exudate. Eyes: EOM are normal. Pupils are equal, round, and reactive to light.  Left eye exhibits no chemosis (minor abrasion noted to the L upper eyelid) and no discharge. Left conjunctiva is injected. Left conjunctiva has a hemorrhage (noted to the lower conjunctiva). Cardiovascular: Normal rate, regular rhythm and normal heart sounds. Pulmonary/Chest: Effort normal and breath sounds normal. No respiratory distress. He has no wheezes. He has no rales. Musculoskeletal: Normal range of motion. Neurological: He is alert and oriented to person, place, and time. Skin: Skin is warm and dry. Psychiatric: He has a normal mood and affect. His behavior is normal. Judgment and thought content normal.  
Nursing note and vitals reviewed. at 12:14 PM 
 
Diagnostic Study Results Labs - No results found for this or any previous visit (from the past 12 hour(s)). Radiologic Studies - No orders to display CT Results  (Last 48 hours) None CXR Results  (Last 48 hours) None Medical Decision Making I am the first provider for this patient. I reviewed the vital signs, available nursing notes, past medical history, past surgical history, family history and social history. Vital Signs-Reviewed the patient's vital signs. Disposition: 
Discharged DISCHARGE NOTE:  
12:10 PM 
  Care plan outlined and precautions discussed. Patient has no new complaints, changes, or physical findings. All medications were reviewed with the patient; will d/c home. All of pt's questions and concerns were addressed. Patient was instructed and agrees to follow up with PCP prn, as well as to return to the ED upon further deterioration. Patient is ready to go home. Follow-up Information Follow up With Specialties Details Why Contact Vinay Wagner, NP Nurse Practitioner Schedule an appointment as soon as possible for a visit in 2 days As needed [address] [place] [facility] [address] 
[phone] [facility] EMERGENCY DEPT Emergency Medicine  If symptoms worsen [facility] Current Discharge Medication List  
  
CONTINUE these medications which have NOT CHANGED Details  
empagliflozin (JARDIANCE) 25 mg tablet Take 1 Tab by mouth daily. Qty: 90 Tab, Refills: 2 Associated Diagnoses: Controlled type 2 diabetes mellitus without complication, without long-term current use of insulin (HCC)  
  
tiotropium (SPIRIVA) 18 mcg inhalation capsule Take 1 Cap by inhalation daily. Qty: 90 Cap, Refills: 0  
  
nitroglycerin (NITROSTAT) 0.4 mg SL tablet 1 Tab by SubLINGual route every five (5) minutes as needed for Chest Pain. Qty: 15 Tab, Refills: 0  
  
metFORMIN (GLUCOPHAGE) 500 mg tablet Take 1 Tab by mouth two (2) times daily (with meals). Qty: 180 Tab, Refills: 0  
  
atorvastatin (LIPITOR) 20 mg tablet Take 1 Tab by mouth nightly. Qty: 90 Tab, Refills: 0  
  
acetaminophen (TYLENOL) 325 mg tablet Take 1 Tab by mouth every four (4) hours as needed for Pain. Qty: 90 Tab, Refills: 0  
  
fluticasone (FLONASE) 50 mcg/actuation nasal spray 2 Sprays by Both Nostrils route daily. Qty: 1 Bottle, Refills: 0 Associated Diagnoses: Acute non-recurrent maxillary sinusitis BLOOD-GLUCOSE METER (FREESTYLE LITE METER) by Does Not Apply route. glucose blood VI test strips (ASCENSIA AUTODISC VI, ONE TOUCH ULTRA TEST VI) strip Freestyle. Check sugar once daily fasting. E11.65 Qty: 100 Strip, Refills: 11 Lancets misc Use as directed. Dx: check sugar once daily. E11.65 freestyle Qty: 100 Each, Refills: 11  
  
aspirin delayed-release 81 mg tablet Take  by mouth daily. fluticasone-salmeterol (ADVAIR) 100-50 mcg/dose diskus inhaler Take 1 Puff by inhalation every twelve (12) hours. metoprolol tartrate (LOPRESSOR) 25 mg tablet Take 25 mg by mouth two (2) times a day. albuterol (PROVENTIL HFA, VENTOLIN HFA, PROAIR HFA) 90 mcg/actuation inhaler Take 2 Puffs by inhalation.   
  
  
 
 
Provider Notes (Medical Decision Making):  
 DDX: eyelid contusion, abrasion, conjunctivitis, subconjunctival hemorrhage Procedures Diagnosis Clinical Impression: 1. Left eye injury, initial encounter 2. Subconjunctival hemorrhage of left eye

## 2018-11-29 ENCOUNTER — HOSPITAL ENCOUNTER (EMERGENCY)
Age: 68
Discharge: HOME | End: 2018-11-29
Attending: EMERGENCY MEDICINE
Payer: MEDICARE

## 2018-11-29 VITALS
OXYGEN SATURATION: 92 % | SYSTOLIC BLOOD PRESSURE: 108 MMHG | DIASTOLIC BLOOD PRESSURE: 69 MMHG | HEART RATE: 91 BPM | TEMPERATURE: 98.1 F | WEIGHT: 202 LBS | BODY MASS INDEX: 29.92 KG/M2 | HEIGHT: 69 IN | RESPIRATION RATE: 20 BRPM

## 2018-11-29 DIAGNOSIS — L03.012 PARONYCHIA OF FINGER, LEFT: Primary | ICD-10-CM

## 2018-11-29 LAB
GLUCOSE BLD STRIP.AUTO-MCNC: 127 MG/DL (ref 65–100)
SERVICE CMNT-IMP: ABNORMAL

## 2018-11-29 PROCEDURE — 75810000451 HC DRAIN ABSC FINGER SIMPLE

## 2018-11-29 PROCEDURE — 82962 GLUCOSE BLOOD TEST: CPT

## 2018-11-29 PROCEDURE — 99283 EMERGENCY DEPT VISIT LOW MDM: CPT

## 2018-11-29 PROCEDURE — 74011250636 HC RX REV CODE- 250/636: Performed by: PHYSICIAN ASSISTANT

## 2018-11-29 RX ORDER — SULFAMETHOXAZOLE AND TRIMETHOPRIM 800; 160 MG/1; MG/1
1 TABLET ORAL 2 TIMES DAILY
Qty: 20 TAB | Refills: 0 | Status: SHIPPED | OUTPATIENT
Start: 2018-11-29 | End: 2018-12-09

## 2018-11-29 RX ORDER — LIDOCAINE HYDROCHLORIDE 10 MG/ML
6 INJECTION, SOLUTION EPIDURAL; INFILTRATION; INTRACAUDAL; PERINEURAL ONCE
Status: COMPLETED | OUTPATIENT
Start: 2018-11-29 | End: 2018-11-29

## 2018-11-29 RX ORDER — ACETAMINOPHEN 325 MG/1
650 TABLET ORAL
Qty: 20 TAB | Refills: 0 | Status: SHIPPED | OUTPATIENT
Start: 2018-11-29 | End: 2019-06-10

## 2018-11-29 RX ADMIN — LIDOCAINE HYDROCHLORIDE 6 ML: 10 INJECTION, SOLUTION EPIDURAL; INFILTRATION; INTRACAUDAL; PERINEURAL at 18:58

## 2018-11-29 NOTE — ED TRIAGE NOTES
C/o left 3rd finger swelling since yesterday, \"a little of liquid came out with a needle. \" denied injury/trauma

## 2018-11-29 NOTE — ED NOTES
.. 
Emergency Department Nursing Plan of Care The Nursing Plan of Care is developed from the Nursing assessment and Emergency Department Attending provider initial evaluation. The plan of care may be reviewed in the ED Provider note. The Plan of Care was developed with the following considerations:  
Patient / Family readiness to learn indicated by:verbalized understanding and appropriate questions asked Persons(s) to be included in education: patient Barriers to Learning/Limitations:No 
 
Signed Daily Szymanski RN   
11/29/2018   6:13 PM

## 2018-11-29 NOTE — ED PROVIDER NOTES
EMERGENCY DEPARTMENT HISTORY AND PHYSICAL EXAM 
 
 
Date: 11/29/2018 Patient Name: Eric Asher History of Presenting Illness Chief Complaint Patient presents with  Finger Swelling History Provided By: Patient HPI: Eric Asher, 76 y.o. male with PMHx significant for COPD, DM, DVT, presents ambulatory to the ED with cc of left hand 3rd digit swelling, throbbing pain, and drainage x 1 day. He explains that he noticed the swelling yesterday and subsequently attempted to \"drain it with a needle. \" Pt notes that a small amount of \"pus\" drained from the finger but it has not drained since. He denies taking any OTC medications for his current pain. Of note, nursing stated that pt recently stopped taking all of his daily medications. Pt specifically denies any fever, chills, SOB, CP, HA, N/V/D, abdominal pain, dysuria, hematuria, or rash. There are no other complaints, changes, or physical findings at this time. Social Hx: - EtOH (former); + Smoker (1 ppd); - Illicit Drugs Family Hx: MI 
Surgical Hx: Stent placement PCP: Reanna Harrison., NP Current Outpatient Medications Medication Sig Dispense Refill  trimethoprim-sulfamethoxazole (BACTRIM DS) 160-800 mg per tablet Take 1 Tab by mouth two (2) times a day for 10 days. 20 Tab 0  
 acetaminophen (TYLENOL) 325 mg tablet Take 2 Tabs by mouth every four (4) hours as needed for Pain. 20 Tab 0  
 empagliflozin (JARDIANCE) 25 mg tablet Take 1 Tab by mouth daily. 90 Tab 2  
 tiotropium (SPIRIVA) 18 mcg inhalation capsule Take 1 Cap by inhalation daily. 90 Cap 0  
 nitroglycerin (NITROSTAT) 0.4 mg SL tablet 1 Tab by SubLINGual route every five (5) minutes as needed for Chest Pain. 15 Tab 0  
 atorvastatin (LIPITOR) 20 mg tablet Take 1 Tab by mouth nightly. 90 Tab 0  
 fluticasone (FLONASE) 50 mcg/actuation nasal spray 2 Sprays by Both Nostrils route daily.  1 Bottle 0  
  BLOOD-GLUCOSE METER (FREESTYLE LITE METER) by Does Not Apply route.  glucose blood VI test strips (ASCENSIA AUTODISC VI, ONE TOUCH ULTRA TEST VI) strip Freestyle. Check sugar once daily fasting. E11.65 100 Strip 11  Lancets misc Use as directed. Dx: check sugar once daily. E11.65 freestyle 100 Each 11  
 aspirin delayed-release 81 mg tablet Take  by mouth daily.  fluticasone-salmeterol (ADVAIR) 100-50 mcg/dose diskus inhaler Take 1 Puff by inhalation every twelve (12) hours.  metoprolol tartrate (LOPRESSOR) 25 mg tablet Take 25 mg by mouth two (2) times a day.  albuterol (PROVENTIL HFA, VENTOLIN HFA, PROAIR HFA) 90 mcg/actuation inhaler Take 2 Puffs by inhalation. Past History Past Medical History: 
Past Medical History:  
Diagnosis Date  COPD   
 bronchitis  Diabetes ([address]  Personal history of DVT (deep vein thrombosis) Past Surgical History: 
Past Surgical History:  
Procedure Laterality Date  CARDIAC SURG PROCEDURE UNLIST  HX CORONARY STENT PLACEMENT    
 twice, last 2015 Family History: 
Family History Problem Relation Age of Onset  Cancer Mother   
     breast  
 Heart Attack Mother Social History: 
Social History Tobacco Use  Smoking status: Current Every Day Smoker Packs/day: 1.00  Smokeless tobacco: Never Used Substance Use Topics  Alcohol use: No  
  Comment: stopped 1994  Drug use: No  
 
 
Allergies: 
No Known Allergies Review of Systems Review of Systems Constitutional: Negative for chills and fever. HENT: Negative for facial swelling. Eyes: Negative for photophobia and visual disturbance. Respiratory: Negative for shortness of breath. Cardiovascular: Negative for chest pain. Gastrointestinal: Negative for abdominal pain, nausea and vomiting. Genitourinary: Negative for dysuria, flank pain and hematuria. Skin: Positive for wound (left hand, 3rd digit).  Negative for color change, pallor and rash. Neurological: Negative for dizziness, weakness, light-headedness and headaches. All other systems reviewed and are negative. Physical Exam  
Physical Exam  
Constitutional: He is oriented to person, place, and time. He appears well-developed and well-nourished. No distress. HENT:  
Head: Normocephalic and atraumatic. Eyes: Conjunctivae are normal.  
Cardiovascular: Normal rate, regular rhythm and normal heart sounds. Pulmonary/Chest: Effort normal and breath sounds normal. No respiratory distress. Abdominal: Soft. Bowel sounds are normal. There is no tenderness. Musculoskeletal: Normal range of motion. Neurological: He is alert and oriented to person, place, and time. No cranial nerve deficit. Skin: Skin is warm. No rash noted. Left hand 3rd digit with swelling and purulent material near nail bed. < 3 second cap refill. Sensation intact. Psychiatric: He has a normal mood and affect. His behavior is normal.  
Nursing note and vitals reviewed. Medical Decision Making I am the first provider for this patient. I reviewed the vital signs, available nursing notes, past medical history, past surgical history, family history and social history. Vital Signs-Reviewed the patient's vital signs. Patient Vitals for the past 12 hrs: 
 Temp Pulse Resp BP SpO2  
11/29/18 1806 98.1 °F (36.7 °C) 91 20 108/69 92 % Pulse Oximetry Analysis - 92% on RA Cardiac Monitor:  
Rate: 91 bpm 
Rhythm: Normal Sinus Rhythm Records Reviewed: Nursing Notes and Old Medical Records Provider Notes (Medical Decision Making): DDx: paronychia, felon, cellulitis ED Course:  
Initial assessment performed. The patients presenting problems have been discussed, and they are in agreement with the care plan formulated and outlined with them. I have encouraged them to ask questions as they arise throughout their visit.  
 
6:18 PM 
 Spoke with pt about why he stopped all daily medications. Pt stated that he lost weight and his sugars started to be normal so he discontinued medications. Procedure Note - Digital Block:  
7:28 PM 
Performed by: Margi Sanders. Robert Espinoza PA-C Lidocaine 1% without epinephrine used to perform digital block of Left middle finger(s). The procedure took 1-15 minutes, and pt tolerated well. Procedure Note - Incision and Drainage:  
7:30 PM 
Performed by: Margi Sanders. Robert Espinoza PA-C Complexity: simple Skin prepped with Betadine. Sterile field established. Anesthesia achieved with 3 mLs of Lidocaine 1% without epinephrine using a digital block. Abscess surrounding nail bed of left 3rd digit was incised with # 11 blade, and 2 mLs of purlent drainage was expressed. Wound probed and irrigated. Sterile dressing applied. Estimated blood loss: 2 cc The procedure took 1-15 minutes, and pt tolerated well. Critical Care Time:  
0 Disposition: 
Discharge Note: 
7:43 PM 
The patient has been re-evaluated and is ready for discharge. Reviewed available results with patient. Counseled patient/parent/guardian on diagnosis and care plan. Patient has expressed understanding, and all questions have been answered. Patient agrees with plan and agrees to follow up as recommended, or return to the ED if their symptoms worsen. Discharge instructions have been provided and explained to the patient, along with reasons to return to the ED. PLAN: 
1. Current Discharge Medication List  
  
START taking these medications Details  
trimethoprim-sulfamethoxazole (BACTRIM DS) 160-800 mg per tablet Take 1 Tab by mouth two (2) times a day for 10 days. Qty: 20 Tab, Refills: 0 CONTINUE these medications which have CHANGED Details  
acetaminophen (TYLENOL) 325 mg tablet Take 2 Tabs by mouth every four (4) hours as needed for Pain. Qty: 20 Tab, Refills: 0 STOP taking these medications metFORMIN (GLUCOPHAGE) 500 mg tablet Comments:  
Reason for Stoppin.  
Follow-up Information Follow up With Specialties Details Why Contact Info Eda Edward, KATHARINA Nurse Practitioner Schedule an appointment as soon as possible for a visit As needed [address] [id number] [phone] Return to ED if worse Diagnosis Clinical Impression: 1. Paronychia of finger, left Attestations: This note is prepared by Diana Sanchez. Shari Calvert, acting as Scribe for [employer]. NORA Robertson PA-C: The scribe's documentation has been prepared under my direction and personally reviewed by me in its entirety. I confirm that the note above accurately reflects all work, treatment, procedures, and medical decision making performed by me.

## 2018-11-30 NOTE — DISCHARGE INSTRUCTIONS
Paronychia: Care Instructions  Your Care Instructions  Paronychia (say \"wpdd-fs-SL-harry-uh\") is an infection of the skin around a fingernail or toenail. It happens when germs enter through a break in the skin. The doctor may have made a small cut in the infected area to drain the pus. Most cases of paronychia improve in a few days. But watch your symptoms and follow your doctor's advice. Though rare, a mild case can turn into something more serious and infect your entire finger or toe. Also, it is possible for an infection to return. Follow-up care is a key part of your treatment and safety. Be sure to make and go to all appointments, and call your doctor if you are having problems. It's also a good idea to know your test results and keep a list of the medicines you take. How can you care for yourself at home? · If your doctor told you how to care for your infected nail, follow the doctor's instructions. If you did not get instructions, follow this general advice:  ? Wash the area with clean water 2 times a day. Don't use hydrogen peroxide or alcohol, which can slow healing. ? You may cover the area with a thin layer of petroleum jelly, such as Vaseline, and a nonstick bandage. ? Apply more petroleum jelly and replace the bandage as needed. · If your doctor prescribed antibiotics, take them as directed. Do not stop taking them just because you feel better. You need to take the full course of antibiotics. · Take an over-the-counter pain medicine, such as acetaminophen (Tylenol), ibuprofen (Advil, Motrin), or naproxen (Aleve). Read and follow all instructions on the label. · Do not take two or more pain medicines at the same time unless the doctor told you to. Many pain medicines have acetaminophen, which is Tylenol. Too much acetaminophen (Tylenol) can be harmful. · Prop up the toe or finger so that it is higher than the level of your heart. This will help with pain and swelling. · Apply heat.  Put a warm water bottle, heating pad set on low, or warm cloth on your finger or toe. Do not go to sleep with a heating pad on your skin. · Soak the area in warm water twice a day for 15 minutes each time. After soaking, dry the area well and apply a thin layer of petroleum jelly, such as Vaseline. Put on a new bandage. When should you call for help? Call your doctor now or seek immediate medical care if:    · You have signs of new or worsening infection, such as:  ? Increased pain, swelling, warmth, or redness. ? Red streaks leading from the infected skin. ? Pus draining from the area. ? A fever.    Watch closely for changes in your health, and be sure to contact your doctor if:    · You do not get better as expected. Where can you learn more? Go to http://dank-damien.info/. Enter C435 in the search box to learn more about \"Paronychia: Care Instructions. \"  Current as of: April 18, 2018  Content Version: 11.8  © 2670-7940 [employer]. Care instructions adapted under license by [employer] (which disclaims liability or warranty for this information). If you have questions about a medical condition or this instruction, always ask your healthcare professional. [address] any warranty or liability for your use of this information.

## 2018-11-30 NOTE — ED NOTES
Nayeli Pearl at bedside reviewing patient's discharge instructions and reviewing medications. Patient ambulatory home. Patient in no apparent distress. Unable to reassess pain.

## 2018-11-30 NOTE — ED NOTES
I&d set-up at bedside. No si/s of acute distress. Call bell within reach. Report given to tj night shift rn, and care passed on of pt.

## 2019-05-05 ENCOUNTER — HOSPITAL ENCOUNTER (EMERGENCY)
Age: 69
Discharge: HOME | End: 2019-05-05
Attending: EMERGENCY MEDICINE
Payer: MEDICARE

## 2019-05-05 VITALS
OXYGEN SATURATION: 98 % | RESPIRATION RATE: 18 BRPM | HEIGHT: 69 IN | DIASTOLIC BLOOD PRESSURE: 67 MMHG | SYSTOLIC BLOOD PRESSURE: 144 MMHG | HEART RATE: 64 BPM | BODY MASS INDEX: 30.36 KG/M2 | WEIGHT: 205 LBS | TEMPERATURE: 97.6 F

## 2019-05-05 DIAGNOSIS — S39.012A STRAIN OF LUMBAR REGION, INITIAL ENCOUNTER: Primary | ICD-10-CM

## 2019-05-05 LAB
APPEARANCE UR: CLEAR
BACTERIA URNS QL MICRO: NEGATIVE /HPF
BILIRUB UR QL: NEGATIVE
COLOR UR: ABNORMAL
EPITH CASTS URNS QL MICRO: ABNORMAL /LPF
GLUCOSE UR STRIP.AUTO-MCNC: 250 MG/DL
HGB UR QL STRIP: NEGATIVE
KETONES UR QL STRIP.AUTO: NEGATIVE MG/DL
LEUKOCYTE ESTERASE UR QL STRIP.AUTO: ABNORMAL
NITRITE UR QL STRIP.AUTO: NEGATIVE
PH UR STRIP: 6 [PH] (ref 5–8)
PROT UR STRIP-MCNC: NEGATIVE MG/DL
RBC #/AREA URNS HPF: ABNORMAL /HPF (ref 0–5)
SP GR UR REFRACTOMETRY: 1.02 (ref 1–1.03)
UA: UC IF INDICATED,UAUC: ABNORMAL
UROBILINOGEN UR QL STRIP.AUTO: 1 EU/DL (ref 0.2–1)
WBC URNS QL MICRO: ABNORMAL /HPF (ref 0–4)

## 2019-05-05 PROCEDURE — 74011250637 HC RX REV CODE- 250/637: Performed by: PHYSICIAN ASSISTANT

## 2019-05-05 PROCEDURE — 81001 URINALYSIS AUTO W/SCOPE: CPT

## 2019-05-05 PROCEDURE — 74011250636 HC RX REV CODE- 250/636: Performed by: PHYSICIAN ASSISTANT

## 2019-05-05 PROCEDURE — 96372 THER/PROPH/DIAG INJ SC/IM: CPT

## 2019-05-05 PROCEDURE — 87086 URINE CULTURE/COLONY COUNT: CPT

## 2019-05-05 PROCEDURE — 99283 EMERGENCY DEPT VISIT LOW MDM: CPT

## 2019-05-05 RX ORDER — METHOCARBAMOL 500 MG/1
500 TABLET, FILM COATED ORAL 4 TIMES DAILY
Qty: 30 TAB | Refills: 0 | Status: SHIPPED | OUTPATIENT
Start: 2019-05-05 | End: 2019-06-10

## 2019-05-05 RX ORDER — METHOCARBAMOL 500 MG/1
500 TABLET, FILM COATED ORAL
Status: COMPLETED | OUTPATIENT
Start: 2019-05-05 | End: 2019-05-05

## 2019-05-05 RX ORDER — KETOROLAC TROMETHAMINE 30 MG/ML
30 INJECTION, SOLUTION INTRAMUSCULAR; INTRAVENOUS
Status: COMPLETED | OUTPATIENT
Start: 2019-05-05 | End: 2019-05-05

## 2019-05-05 RX ORDER — LIDOCAINE 4 G/100G
PATCH TOPICAL
Qty: 20 PATCH | Refills: 0 | Status: SHIPPED | OUTPATIENT
Start: 2019-05-05 | End: 2019-06-10

## 2019-05-05 RX ADMIN — KETOROLAC TROMETHAMINE 30 MG: 30 INJECTION, SOLUTION INTRAMUSCULAR; INTRAVENOUS at 18:39

## 2019-05-05 RX ADMIN — METHOCARBAMOL TABLETS 500 MG: 500 TABLET, COATED ORAL at 18:39

## 2019-05-05 NOTE — ED NOTES
Emergency Department Nursing Plan of Care The Nursing Plan of Care is developed from the Nursing assessment and Emergency Department Attending provider initial evaluation. The plan of care may be reviewed in the ED Provider note. The Plan of Care was developed with the following considerations:  
Patient / Family readiness to learn indicated by:verbalized understanding Persons(s) to be included in education: patient Barriers to Learning/Limitations:No 
 
Signed Erica Tirado RN   
5/5/2019   6:32 PM 
.

## 2019-05-05 NOTE — DISCHARGE INSTRUCTIONS
Patient Education        Back Strain: Care Instructions  Overview    A back strain happens when you overstretch, or pull, a muscle in your back. You may hurt your back in an accident or when you exercise or lift something. Sometimes you may not know how you hurt your back. Most back pain will get better with rest and time. You can take care of yourself at home to help your back heal.  Follow-up care is a key part of your treatment and safety. Be sure to make and go to all appointments, and call your doctor if you are having problems. It's also a good idea to know your test results and keep a list of the medicines you take. How can you care for yourself at home? · Try to stay as active as you can, but stop or reduce any activity that causes pain. · Put ice or a cold pack on the sore muscle for 10 to 20 minutes at a time to stop swelling. Try this every 1 to 2 hours for 3 days (when you are awake) or until the swelling goes down. Put a thin cloth between the ice pack and your skin. · After 2 or 3 days, apply a heating pad on low or a warm cloth to your back. Some doctors suggest that you go back and forth between hot and cold treatments. · Take pain medicines exactly as directed. ? If the doctor gave you a prescription medicine for pain, take it as prescribed. ? If you are not taking a prescription pain medicine, ask your doctor if you can take an over-the-counter medicine. · Try sleeping on your side with a pillow between your legs. Or put a pillow under your knees when you lie on your back. These measures can ease pain in your lower back. · Return to your usual level of activity slowly. When should you call for help? Call 911 anytime you think you may need emergency care. For example, call if:    · You are unable to move a leg at all.   [facility] your doctor now or seek immediate medical care if:    · You have new or worse symptoms in your legs, belly, or buttocks.  Symptoms may include:  ? Numbness or tingling. ? Weakness. ? Pain.     · You lose bladder or bowel control.    Watch closely for changes in your health, and be sure to contact your doctor if:    · You have a fever, lose weight, or don't feel well.     · You are not getting better as expected. Where can you learn more? Go to http://dank-damien.info/. Enter F467 in the search box to learn more about \"Back Strain: Care Instructions. \"  Current as of: September 20, 2018  Content Version: 11.9  © 6653-1567 [employer]. Care instructions adapted under license by [employer] (which disclaims liability or warranty for this information). If you have questions about a medical condition or this instruction, always ask your healthcare professional. [address] any warranty or liability for your use of this information. Patient Education        Strain or Sprain: Care Instructions  Your Care Instructions    A strain happens when you overstretch, or pull, a muscle. A sprain occurs when you stretch or tear a ligament, the tough tissue that connects one bone to another. These problems can happen when you exercise or lift something or when you are in an accident. Rest and other home care can help strains and sprains heal.  The doctor has checked you carefully, but problems can develop later. If you notice any problems or new symptoms,  get medical treatment right away. Follow-up care is a key part of your treatment and safety. Be sure to make and go to all appointments, and call your doctor if you are having problems. It's also a good idea to know your test results and keep a list of the medicines you take. How can you care for yourself at home? · If your doctor gave you a sling, splint, brace, or immobilizer, use it exactly as directed. · Rest the strained or sprained area, and follow your doctor's advice about when you can be active again.   · Put ice or a cold pack on the sore area for 10 to 20 minutes at a time to stop swelling. Try this every 1 to 2 hours for 3 days (when you are awake) or until the swelling goes down. Put a thin cloth between the ice pack and your skin. Keep your splint or brace dry. · Prop up a sore arm or leg on a pillow when you ice it or anytime you sit or lie down. Try to keep it higher than the level of your heart. This will help reduce swelling. · Take pain medicines exactly as directed. ? If the doctor gave you a prescription medicine for pain, take it as prescribed. ? If you are not taking a prescription pain medicine, ask your doctor if you can take an over-the-counter medicine. · Do exercises as directed by your doctor or physical therapist.  · Return to your usual level of activity slowly. · Do not do anything that makes the pain worse. When should you call for help? Call your doctor now or seek immediate medical care if:    · You have severe or increasing pain.     · You have tingling, weakness, or numbness in the area.     · The area turns cold or changes color.     · Your cast or splint feels too tight.     · You have symptoms of a blood clot, such as:  ? Pain in your calf, back of the knee, thigh, or groin. ? Redness and swelling in your leg or groin.     · You cannot move the strained part of your body.    Watch closely for changes in your health, and be sure to contact your doctor if:    · You do not get better as expected. Where can you learn more? Go to http://dank-damien.info/. Enter T934 in the search box to learn more about \"Strain or Sprain: Care Instructions. \"  Current as of: September 20, 2018  Content Version: 11.9  © 7878-9618 [employer]. Care instructions adapted under license by [employer] (which disclaims liability or warranty for this information).  If you have questions about a medical condition or this instruction, always ask your healthcare professional. [employer] disclaims any warranty or liability for your use of this information.

## 2019-05-05 NOTE — ED PROVIDER NOTES
EMERGENCY DEPARTMENT HISTORY AND PHYSICAL EXAM 
 
 
Date: 5/5/2019 Patient Name: Heri Gutiérrez History of Presenting Illness Chief Complaint Patient presents with  Back Pain  
  and hip, lower right History Provided By: Patient HPI: Heri Gutiérrez, 71 y.o. male with PMHx  for DM, COPD, dvt, presents to the ED with cc of right lower back pain radiating down to his leg that started yesterday. Patient states that earlier yesterday he helped a neighbor install a air conditioner, later during the day he noticed a twinge to his back he took some Motrin. Patient denies any trauma to his back, falls,hematochezia, saddle anesthesia, loss of bowel/bladder function, hematuria, fevers, chills, nausea, vomiting, chest pain, shortness of breath, headache, rash, diarrhea, sweating or weight loss. All other ROS negative at this time Pt is in no acute distress and is speaking in full sentences There are no other complaints, changes, or physical findings at this time. Social History Tobacco Use  Smoking status: Current Every Day Smoker Packs/day: 1.00  Smokeless tobacco: Never Used Substance Use Topics  Alcohol use: No  
  Comment: stopped 1994  Drug use: No  
  Types: Marijuana No Known Allergies PCP: Rubia Jones NP No current facility-administered medications on file prior to encounter. Current Outpatient Medications on File Prior to Encounter Medication Sig Dispense Refill  acetaminophen (TYLENOL) 325 mg tablet Take 2 Tabs by mouth every four (4) hours as needed for Pain. 20 Tab 0  [DISCONTINUED] rivaroxaban (XARELTO) 10 mg tablet Take 1 Tab by mouth two (2) times daily (with meals). Indications: PREVENTION OF PULMONARY THROMBOEMBOLISM RECURRENCE 60 Tab 12  
 empagliflozin (JARDIANCE) 25 mg tablet Take 1 Tab by mouth daily. 90 Tab 2  
 tiotropium (SPIRIVA) 18 mcg inhalation capsule Take 1 Cap by inhalation daily.  90 Cap 0  
  nitroglycerin (NITROSTAT) 0.4 mg SL tablet 1 Tab by SubLINGual route every five (5) minutes as needed for Chest Pain. 15 Tab 0  
 atorvastatin (LIPITOR) 20 mg tablet Take 1 Tab by mouth nightly. 90 Tab 0  
 fluticasone (FLONASE) 50 mcg/actuation nasal spray 2 Sprays by Both Nostrils route daily. 1 Bottle 0  
 BLOOD-GLUCOSE METER (FREESTYLE LITE METER) by Does Not Apply route.  glucose blood VI test strips (ASCENSIA AUTODISC VI, ONE TOUCH ULTRA TEST VI) strip Freestyle. Check sugar once daily fasting. E11.65 100 Strip 11  Lancets misc Use as directed. Dx: check sugar once daily. E11.65 freestyle 100 Each 11  
 aspirin delayed-release 81 mg tablet Take  by mouth daily.  fluticasone-salmeterol (ADVAIR) 100-50 mcg/dose diskus inhaler Take 1 Puff by inhalation every twelve (12) hours.  metoprolol tartrate (LOPRESSOR) 25 mg tablet Take 25 mg by mouth two (2) times a day.  albuterol (PROVENTIL HFA, VENTOLIN HFA, PROAIR HFA) 90 mcg/actuation inhaler Take 2 Puffs by inhalation. Past History Past Medical History: 
Past Medical History:  
Diagnosis Date  COPD   
 bronchitis  Diabetes ([address]  Personal history of DVT (deep vein thrombosis) Past Surgical History: 
Past Surgical History:  
Procedure Laterality Date  CARDIAC SURG PROCEDURE UNLIST  HX CORONARY STENT PLACEMENT    
 twice, last 2015 Family History: 
Family History Problem Relation Age of Onset  Cancer Mother   
     breast  
 Heart Attack Mother Social History: 
Social History Tobacco Use  Smoking status: Current Every Day Smoker Packs/day: 1.00  Smokeless tobacco: Never Used Substance Use Topics  Alcohol use: No  
  Comment: stopped 1994  Drug use: No  
  Types: Marijuana Allergies: 
No Known Allergies Review of Systems Review of Systems Constitutional: Negative. Negative for chills and fever. HENT: Negative. Eyes: Negative. Respiratory: Negative. Negative for shortness of breath. Cardiovascular: Negative. Negative for chest pain. Gastrointestinal: Negative. Negative for abdominal pain, diarrhea, nausea and vomiting. Endocrine: Negative. Genitourinary: Negative. Musculoskeletal: Positive for back pain. Negative for arthralgias, gait problem, joint swelling, myalgias and neck pain. Skin: Negative. Allergic/Immunologic: Negative. Neurological: Negative. Negative for headaches. Hematological: Negative. Psychiatric/Behavioral: Negative. All other systems reviewed and are negative. Physical Exam  
Physical Exam  
Constitutional: He is oriented to person, place, and time. He appears well-developed and well-nourished. No distress. HENT:  
Head: Normocephalic and atraumatic. Right Ear: External ear normal.  
Left Ear: External ear normal.  
Nose: Nose normal.  
Mouth/Throat: Oropharynx is clear and moist.  
Eyes: Pupils are equal, round, and reactive to light. Conjunctivae and EOM are normal.  
Neck: Normal range of motion. Neck supple. No tracheal deviation present. Cardiovascular: Normal rate, regular rhythm, normal heart sounds and intact distal pulses. Pulmonary/Chest: Effort normal and breath sounds normal. No respiratory distress. He has no wheezes. He has no rales. Abdominal: Soft. Bowel sounds are normal. He exhibits no distension. There is no tenderness. There is no rebound, no guarding, no CVA tenderness, no tenderness at McBurney's point and negative Bermeo's sign. Musculoskeletal: Normal range of motion. He exhibits tenderness. He exhibits no edema or deformity. Cervical back: Normal. He exhibits normal range of motion, no tenderness, no bony tenderness, no swelling, no edema, no deformity, no laceration, no pain, no spasm and normal pulse.   
     Thoracic back: Normal. He exhibits normal range of motion, no tenderness, no bony tenderness, no swelling, no edema, no deformity, no laceration, no pain, no spasm and normal pulse. Lumbar back: Normal. He exhibits normal range of motion, no tenderness, no bony tenderness, no swelling, no edema, no deformity, no laceration, no pain, no spasm and normal pulse. Back: 
 
Lymphadenopathy:  
  He has no cervical adenopathy. Neurological: He is alert and oriented to person, place, and time. He has normal reflexes. He displays normal reflexes. No cranial nerve deficit. He exhibits normal muscle tone. Coordination normal.  
Skin: Skin is warm and dry. No rash noted. He is not diaphoretic. No pallor. Psychiatric: He has a normal mood and affect. His behavior is normal. Judgment and thought content normal.  
Nursing note and vitals reviewed. Diagnostic Study Results Labs - Recent Results (from the past 12 hour(s)) URINALYSIS W/ REFLEX CULTURE Collection Time: 05/05/19  7:20 PM  
Result Value Ref Range Color YELLOW/STRAW Appearance CLEAR CLEAR Specific gravity 1.025 1.003 - 1.030    
 pH (UA) 6.0 5.0 - 8.0 Protein NEGATIVE  NEG mg/dL Glucose 250 (A) NEG mg/dL Ketone NEGATIVE  NEG mg/dL Bilirubin NEGATIVE  NEG Blood NEGATIVE  NEG Urobilinogen 1.0 0.2 - 1.0 EU/dL Nitrites NEGATIVE  NEG Leukocyte Esterase SMALL (A) NEG    
 WBC 5-10 0 - 4 /hpf  
 RBC 0-5 0 - 5 /hpf Epithelial cells FEW FEW /lpf Bacteria NEGATIVE  NEG /hpf  
 UA:UC IF INDICATED URINE CULTURE ORDERED (A) [facility] Radiologic Studies - No orders to display CT Results  (Last 48 hours) None CXR Results  (Last 48 hours) None Medical Decision Making I am the first provider for this patient. I reviewed the vital signs, available nursing notes, past medical history, past surgical history, family history and social history. Vital Signs-Reviewed the patient's vital signs. Patient Vitals for the past 12 hrs: Temp Pulse Resp BP SpO2  
05/05/19 1806 97.6 °F (36.4 °C) 64 18 144/67 98 % Records Reviewed: Nursing Notes, Old Medical Records, Previous Radiology Studies and Previous Laboratory Studies Provider Notes (Medical Decision Making): The patient complains of low back pain. These symptoms are consistent with a lumbar strain. Less likely  pathology, aortic dissection or AAA, or cauda equina given that there are no red flags and a benign physical exam.  
I have recommended rest, avoiding heavy lifting until better, use of intermittent heat (avoid sleeping on a heating pad), and use of OTC NSAID's (Advil, Aleve etc) or Tylenol prn for pain. Call PCP if back pain persists or he develops leg symptoms. It has also been explained that this may take up to three months to fully resolved and that smoking may slow that process even more. Worsening si/sxs discussed extensively Follow up with PCP or RTC if symptoms/signs worsen Side effects of medication discussed Education materials provided at discharge Pt verbalizes agreement with plan ED Course:  
Initial assessment performed. The patients presenting problems have been discussed, and they are in agreement with the care plan formulated and outlined with them. I have encouraged them to ask questions as they arise throughout their visit. Disposition: 
Discharge Care plan outlined and precautions discussed. Patient has no new complaints, changes, or physical findings. Results of visit were reviewed with the patient. All medications were reviewed with the patient; will d/c home. All of pt's questions and concerns were addressed. Patient was instructed and agrees to follow up with pcp, as well as to return to the ED upon further deterioration. Patient is ready to go home. Diagnosis Clinical Impression: 1. Strain of lumbar region, initial encounter

## 2019-05-07 LAB
BACTERIA SPEC CULT: NORMAL
CC UR VC: NORMAL
SERVICE CMNT-IMP: NORMAL

## 2019-06-10 ENCOUNTER — OFFICE VISIT (OUTPATIENT)
Dept: INTERNAL MEDICINE CLINIC | Age: 69
End: 2019-06-10

## 2019-06-10 VITALS
SYSTOLIC BLOOD PRESSURE: 138 MMHG | HEIGHT: 69 IN | RESPIRATION RATE: 20 BRPM | DIASTOLIC BLOOD PRESSURE: 72 MMHG | HEART RATE: 83 BPM | BODY MASS INDEX: 29.67 KG/M2 | WEIGHT: 200.3 LBS | OXYGEN SATURATION: 97 %

## 2019-06-10 DIAGNOSIS — R46.89 NON-COMPLIANT BEHAVIOR: ICD-10-CM

## 2019-06-10 DIAGNOSIS — E11.9 CONTROLLED TYPE 2 DIABETES MELLITUS WITHOUT COMPLICATION, WITHOUT LONG-TERM CURRENT USE OF INSULIN (HCC): Primary | ICD-10-CM

## 2019-06-10 DIAGNOSIS — R29.898 LEFT ARM WEAKNESS: ICD-10-CM

## 2019-06-10 DIAGNOSIS — I25.119 CORONARY ARTERY DISEASE INVOLVING NATIVE HEART WITH ANGINA PECTORIS, UNSPECIFIED VESSEL OR LESION TYPE (HCC): ICD-10-CM

## 2019-06-10 PROBLEM — D68.9 BLOOD CLOTTING DISORDER (HCC): Status: RESOLVED | Noted: 2018-02-27 | Resolved: 2019-06-10

## 2019-06-10 PROBLEM — J44.0 CHRONIC OBSTRUCTIVE PULMONARY DISEASE WITH ACUTE LOWER RESPIRATORY INFECTION (HCC): Status: RESOLVED | Noted: 2018-01-12 | Resolved: 2019-06-10

## 2019-06-10 LAB
ALBUMIN UR QL STRIP: 30 MG/L
CHOLEST SERPL-MCNC: 137 MG/DL
CREATININE, URINE POC: 300 MG/DL
GLUCOSE POC: 125 MG/DL
HBA1C MFR BLD HPLC: 6.5 %
HDLC SERPL-MCNC: 31 MG/DL
LDL CHOLESTEROL POC: 76 MG/DL
MICROALBUMIN/CREAT RATIO POC: <30 MG/G
NON-HDL GOAL (POC): 106
TCHOL/HDL RATIO (POC): 2.5
TRIGL SERPL-MCNC: 148 MG/DL

## 2019-06-10 RX ORDER — ATORVASTATIN CALCIUM 20 MG/1
20 TABLET, FILM COATED ORAL
Qty: 90 TAB | Refills: 0 | Status: SHIPPED | OUTPATIENT
Start: 2019-06-10 | End: 2021-03-10 | Stop reason: SDUPTHER

## 2019-06-10 NOTE — PROGRESS NOTES
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 Diabetic Review of Systems - no meds in almost a year. Has checked his sugars at home - values around the 100-120's. Hx of CAD w/ drug-eluting stent to LAD   Stopped statin bc he was tired of taking meds    When pt was brought back to the room, LPN called me in for concerning symptoms. Pt was c/o central chest pain, left sided weakness and slurred speech. Vitals were stable. When provider entered the room he had symptom resolution after he was recc to go to the ED. He states that he has been having these \"episodes\" since 2014 and he has been worked up by neurology in [place]. He states he has called EMS several times for these episodes and nothing is found in the ED. No notes available. He has risk factors for CVA: hx of stent, smoking, hx of DVT w/ some concern for clotting disorder. He has seen hematology and xarelto was dc'd. He continues to smoke, he lives alone. Reviewed PmHx, RxHx, FmHx, SocHx, AllgHx and updated in chart.   Family History   Problem Relation Age of Onset    Cancer Mother         breast    Heart Attack Mother        Past Medical History:   Diagnosis Date    COPD     bronchitis    Diabetes ([facility] [address]     Personal history of DVT (deep vein thrombosis)       Social History     Socioeconomic History    Marital status: SINGLE     Spouse name: Not on file    Number of children: Not on file    Years of education: Not on file    Highest education level: Not on file   Tobacco Use    Smoking status: Current Every Day Smoker     Packs/day: 1.00    Smokeless tobacco: Never Used   Substance and Sexual Activity    Alcohol use: No     Comment: stopped 1994    Drug use: No     Types: Marijuana    Sexual activity: Not Currently   Social History Narrative    12/1/18: goes back and forth from here and [place], plans on being in [place] for a while. Current Outpatient Medications   Medication Sig    atorvastatin (LIPITOR) 20 mg tablet Take 1 Tab by mouth nightly.  nitroglycerin (NITROSTAT) 0.4 mg SL tablet 1 Tab by SubLINGual route every five (5) minutes as needed for Chest Pain.  BLOOD-GLUCOSE METER (FREESTYLE LITE METER) by Does Not Apply route.  glucose blood VI test strips (ASCENSIA AUTODISC VI, ONE TOUCH ULTRA TEST VI) strip Freestyle. Check sugar once daily fasting. E11.65    Lancets misc Use as directed. Dx: check sugar once daily. E11.65 freestyle    aspirin delayed-release 81 mg tablet Take  by mouth daily.  fluticasone-salmeterol (ADVAIR) 100-50 mcg/dose diskus inhaler Take 1 Puff by inhalation every twelve (12) hours.  albuterol (PROVENTIL HFA, VENTOLIN HFA, PROAIR HFA) 90 mcg/actuation inhaler Take 2 Puffs by inhalation. No current facility-administered medications for this visit. Review of Systems:   Constitutional:    Negative for fever and chills, negative diaphoresis. HEENT:              Negative for neck pain and stiffness. Eyes:                  Negative for visual disturbance, itching, redness or discharge. Respiratory:        Negative for cough and shortness of breath. Cardiovascular:  Negative for chest pain and palpitations. No chest pain by the time provider entered room  Gastrointestinal: Negative for nausea, vomiting, abdominal pain, diarrhea or constipation. Genitourinary:     Negative for dysuria and frequency. Musculoskeletal: Negative for falls, tenderness and swelling. Skin:                    Negative for rash, masses or lesions. Neurological:       Negative for dizzyness, seizure, loss of consciousness, weakness and numbness.      Objective:     Vitals:    06/10/19 1441   BP: 138/72   Pulse: 83   Resp: 20   SpO2: 97%   Weight: 200 lb 4.8 oz (90.9 kg)   Height: 5' 9\" (1.753 m)     Results for orders placed or performed in visit on 06/10/19   AMB POC GLUCOSE BLOOD, BY GLUCOSE MONITORING DEVICE   Result Value Ref Range    Glucose  mg/dL   AMB POC HEMOGLOBIN A1C   Result Value Ref Range    Hemoglobin A1c (POC) 6.5 %   AMB POC URINE, MICROALBUMIN, SEMIQUANT (3 RESULTS)   Result Value Ref Range    ALBUMIN, URINE POC 30 Negative mg/L    CREATININE, URINE  mg/dL    Microalbumin/creat ratio (POC) <30 <30 MG/G   AMB POC LIPID PROFILE   Result Value Ref Range    Cholesterol (POC) 137     Triglycerides (POC) 148     HDL Cholesterol (POC) 31     LDL Cholesterol (POC) 76 MG/DL    Non-HDL Goal (POC) 106     TChol/HDL Ratio (POC) 2.5            Physical Examination: General appearance - alert, well appearing, and in no distress  Mental status - alert, oriented to person, place, and time  Chest - clear to auscultation, no wheezes, rales or rhonchi, symmetric air entry  Heart - normal rate, regular rhythm, normal S1, S2, no murmurs, rubs, clicks or gallops   strength 5/5 bilat  Extremities - no pedal edema noted        Assessment/ Plan:     Follow-up and Dispositions    · Return in about 6 months (around 12/10/2019), or if symptoms worsen or fail to improve, for dm. No acute stroke symptoms when provider entered room. Pt still advised to go to ED for eval, he refused. AMA form signed. He agrees to f/u outpatient w/ cardiology and neurology, although doubtful he will do so. Risk of possible TIAs discussed, risk of sudden death, permanent disability discussed. Offered pt walker, he declined. DM, HTN and lipids are well controlled, he claims he cured himself. 1. Controlled type 2 diabetes mellitus without complication, without long-term current use of insulin (HCC)    - AMB POC GLUCOSE BLOOD, BY GLUCOSE MONITORING DEVICE  - AMB POC HEMOGLOBIN A1C  - AMB POC URINE, MICROALBUMIN, SEMIQUANT (3 RESULTS)  - AMB POC LIPID PROFILE    2. Coronary artery disease involving native heart with angina pectoris, unspecified vessel or lesion type ([facility] [address]  Encouraged resume statin, he states he will think about it  - REFERRAL TO CARDIOLOGY  - METABOLIC PANEL, COMPREHENSIVE  - CBC W/O DIFF    3. Left arm weakness    - REFERRAL TO NEUROLOGY    4. Non-compliant behavior        I have discussed the diagnosis with the patient and the intended plan as seen in the above orders. The patient has received an after-visit summary and questions were answered concerning future plans. Pt conveyed understanding of plan. Medication Side Effects and Warnings were discussed with patient: yes  Patient Labs were reviewed: yes  Patient Past Records were reviewed:  yes    Arelis Clear.  Mic Stratton NP

## 2019-06-10 NOTE — PROGRESS NOTES
Chief Complaint   Patient presents with    Aphasia    Numbness     Lt arm    Chest Pain    Diabetes     1. Have you been to the ER, urgent care clinic since your last visit? Hospitalized since your last visit? No    2. Have you seen or consulted any other health care providers outside of the [address] [place] [address] since your last visit? Include any pap smears or colon screening.  No

## 2019-06-10 NOTE — PATIENT INSTRUCTIONS
I would like you to to go the emergency room to rule out a stroke. If you will not go, please call [phone] if this happens again. You are at risk of death, permanent disability if you have a stroke.   Please follow up outpatient with neurology and cardiology

## 2019-09-14 ENCOUNTER — HOSPITAL ENCOUNTER (EMERGENCY)
Age: 69
Discharge: HOME | End: 2019-09-14
Attending: EMERGENCY MEDICINE
Payer: MEDICARE

## 2019-09-14 VITALS
HEART RATE: 76 BPM | RESPIRATION RATE: 16 BRPM | HEIGHT: 69 IN | OXYGEN SATURATION: 94 % | TEMPERATURE: 97.8 F | BODY MASS INDEX: 31.4 KG/M2 | SYSTOLIC BLOOD PRESSURE: 143 MMHG | WEIGHT: 212 LBS | DIASTOLIC BLOOD PRESSURE: 72 MMHG

## 2019-09-14 DIAGNOSIS — R07.89 ATYPICAL CHEST PAIN: Primary | ICD-10-CM

## 2019-09-14 PROCEDURE — 93005 ELECTROCARDIOGRAM TRACING: CPT

## 2019-09-14 PROCEDURE — 99283 EMERGENCY DEPT VISIT LOW MDM: CPT

## 2019-09-14 NOTE — DISCHARGE INSTRUCTIONS

## 2019-09-14 NOTE — ED NOTES
After the EKG was performed and the provider was in the patients room the patient said he just wanted the EKG and did not want any other testing. The provider advised the patient that this would not be in his best interest and that and EKG is only on test and that he needed additional testing. He said no, he wants to leave and come back on Monday.

## 2019-09-14 NOTE — ED NOTES
Emergency Department Nursing Plan of Care       The Nursing Plan of Care is developed from the Nursing assessment and Emergency Department Attending provider initial evaluation. The plan of care may be reviewed in the ED Provider note.     The Plan of Care was developed with the following considerations:   Patient / Family readiness to learn indicated by:verbalized understanding  Persons(s) to be included in education: patient  Barriers to Learning/Limitations:No    [address] Tomas, RN    9/14/2019   6:33 PM

## 2019-09-14 NOTE — ED PROVIDER NOTES
EMERGENCY DEPARTMENT HISTORY AND PHYSICAL EXAM      Date: 9/14/2019  Patient Name: Shannan Joy    History of Presenting Illness     Chief Complaint   Patient presents with    Chest Pain     times one day        History Provided By: Patient    HPI: Shannan Joy, 71 y.o. male with PMHx significant for DM, COPD, DVT, presents ambulatory to the ED with cc of L-sided, sharp, stabbing CP with associated SOB and lightheadedness x 1 day. Pt reports a hx of similar pain x \"years\" but states that \"no one has ever been able to tell me what was wrong. Of note, pt has 2 stents. He specifically denies any fever, chills, cough, HA, N/V/D, abdominal pain, or rash. There are no other complaints, changes, or physical findings at this time. Social Hx: - EtOH; + Smoker (1 ppd); - Illicit Drugs    PCP: Braulio Chen, NP    Current Outpatient Medications   Medication Sig Dispense Refill    atorvastatin (LIPITOR) 20 mg tablet Take 1 Tab by mouth nightly. 90 Tab 0    nitroglycerin (NITROSTAT) 0.4 mg SL tablet 1 Tab by SubLINGual route every five (5) minutes as needed for Chest Pain. 15 Tab 0    BLOOD-GLUCOSE METER (FREESTYLE LITE METER) by Does Not Apply route.  glucose blood VI test strips (ASCENSIA AUTODISC VI, ONE TOUCH ULTRA TEST VI) strip Freestyle. Check sugar once daily fasting. E11.65 100 Strip 11    Lancets misc Use as directed. Dx: check sugar once daily. E11.65 freestyle 100 Each 11    aspirin delayed-release 81 mg tablet Take  by mouth daily.  fluticasone-salmeterol (ADVAIR) 100-50 mcg/dose diskus inhaler Take 1 Puff by inhalation every twelve (12) hours.  albuterol (PROVENTIL HFA, VENTOLIN HFA, PROAIR HFA) 90 mcg/actuation inhaler Take 2 Puffs by inhalation.          Past History     Past Medical History:  Past Medical History:   Diagnosis Date    COPD     bronchitis    Diabetes ([address]     Personal history of DVT (deep vein thrombosis)        Past Surgical History:  Past Surgical History:   Procedure Laterality Date    CARDIAC SURG PROCEDURE UNLIST      HX CORONARY STENT PLACEMENT      twice, last 2015       Family History:  Family History   Problem Relation Age of Onset    Cancer Mother         breast    Heart Attack Mother        Social History:  Social History     Tobacco Use    Smoking status: Current Every Day Smoker     Packs/day: 1.00    Smokeless tobacco: Never Used   Substance Use Topics    Alcohol use: No     Comment: stopped 1994    Drug use: No     Types: Marijuana       Allergies:  No Known Allergies    Review of Systems   Review of Systems   Constitutional: Negative for chills and fever. HENT: Negative for sore throat and trouble swallowing. Eyes: Negative for photophobia and redness. Respiratory: Positive for shortness of breath. Negative for cough. Cardiovascular: Positive for chest pain. Negative for leg swelling. Gastrointestinal: Negative for abdominal pain, constipation, diarrhea, nausea and vomiting. Endocrine: Negative for polydipsia and polyuria. Genitourinary: Negative for dysuria, hematuria and scrotal swelling. Musculoskeletal: Negative for back pain and joint swelling. Skin: Negative for rash. Neurological: Positive for light-headedness. Negative for dizziness, syncope, weakness and headaches. Psychiatric/Behavioral: Negative for suicidal ideas. All other systems reviewed and are negative. Physical Exam   Physical Exam   Constitutional: He is oriented to person, place, and time. He appears well-developed and well-nourished. HENT:   Head: Normocephalic and atraumatic. Mouth/Throat: Oropharynx is clear and moist and mucous membranes are normal.   Eyes: EOM are normal.   Neck: Normal range of motion and full passive range of motion without pain. Neck supple. Cardiovascular: Normal rate, regular rhythm, normal heart sounds, intact distal pulses and normal pulses. No murmur heard.   Pulmonary/Chest: Effort normal and breath sounds normal. No respiratory distress. He has no wheezes. He has no rhonchi. He has no rales. He exhibits no tenderness. Abdominal: Soft. Normal appearance and bowel sounds are normal. There is no tenderness. There is no rebound and no guarding. Musculoskeletal:        Right lower leg: He exhibits no edema. Left lower leg: He exhibits no edema. Neurological: He is alert and oriented to person, place, and time. He has normal strength. Skin: Skin is warm, dry and intact. No rash noted. No erythema. Psychiatric: He has a normal mood and affect. His speech is normal and behavior is normal. Judgment and thought content normal.   Nursing note and vitals reviewed. Diagnostic Study Results     Labs -     Recent Results (from the past 12 hour(s))   EKG, 12 LEAD, INITIAL    Collection Time: 09/14/19  6:26 PM   Result Value Ref Range    Ventricular Rate 79 BPM    Atrial Rate 79 BPM    P-R Interval 164 ms    QRS Duration 102 ms    Q-T Interval 368 ms    QTC Calculation (Bezet) 421 ms    Calculated P Axis 47 degrees    Calculated R Axis 67 degrees    Calculated T Axis 49 degrees    Diagnosis       Normal sinus rhythm  Cannot rule out Inferior infarct , age undetermined  Abnormal ECG  When compared with ECG of 30-MAY-2018 12:38,  No significant change was found       Medical Decision Making   I am the first provider for this patient. I reviewed the vital signs, available nursing notes, past medical history, past surgical history, family history and social history. Vital Signs-Reviewed the patient's vital signs. Patient Vitals for the past 12 hrs:   Temp Pulse Resp BP SpO2   09/14/19 1820 97.8 °F (36.6 °C) 76 16 143/72 94 %       Pulse Oximetry Analysis - 94% on RA    Cardiac Monitor:   Rate: 79 bpm  Rhythm: Normal Sinus Rhythm      EKG interpretation: (Preliminary)1826  Rhythm: normal sinus rhythm; and regular . Rate (approx.): 79;  Axis: normal; TN interval: normal; QRS interval: normal ; ST/T wave: normal.  Written by Gregorio Dugan. Zainab Blackburn, ED Scribe, as dictated by Carissa Salcido MD    Records Reviewed: Nursing Notes, Old Medical Records and Previous electrocardiograms    Provider Notes (Medical Decision Making):   DDx: ACS, pleurisy, costochondritis    ED Course:   Initial assessment performed. The patients presenting problems have been discussed, and they are in agreement with the care plan formulated and outlined with them. I have encouraged them to ask questions as they arise throughout their visit. 6:30 PM  After EKG completed pt refused further workup and stated \"I only wanted to make sure my EKG was ok I'll see my doctor Monday. \"    Critical Care Time:   None    Disposition:  6:31 PM    I informed the pt that he needed further workup for adequate evaluation for his chest pain, and that by refusing the above, he is at risk for myocardial infarction, arrhythmia, sudden death, paralysis, further deterioration, coma. He is awake, alert, and he understands his condition and the risks involved in leaving. He is clinically aware of his surroundings and able to ask appropriate questions, the nurse present confirms he is not clinically intoxicated and able to make medical decisions. He verbalized knowing the risks and understood it was recommended that he stay and could also return at any time. He was provided with warnings regarding worsening of his condition and provided instructions to follow up with Kathleen Victor MD (Cardiology) tomorrow or return to the Emergency Room as soon as possible. This discussion was witnessed by nurse Kurtis Andrea. PLAN:  1. No discharge medications  2. Follow-up Information     Follow up With Specialties Details Why Contact Info    Claudene Michaels, MD Cardiology In 3 days  [address] [phone]          Return to ED if worse     Diagnosis     Clinical Impression:   1.  Atypical chest pain        This note is prepared by [facility] Alejo Waters, acting as Scribe for MD Bradley Gallego MD: The scribe's documentation has been prepared under my direction and personally reviewed by me in its entirety. I confirm that the note above accurately reflects all work, treatment, procedures, and medical decision making performed by me. This note will not be viewable in [address]

## 2019-09-14 NOTE — ED NOTES
Patient states he is here today with c/o stabbing chest pain that started this morning. He states he has had this chest pain before and was seen at the [facility] and they could not find anything wrong with him.

## 2019-09-15 LAB
ATRIAL RATE: 79 BPM
CALCULATED P AXIS, ECG09: 47 DEGREES
CALCULATED R AXIS, ECG10: 67 DEGREES
CALCULATED T AXIS, ECG11: 49 DEGREES
DIAGNOSIS, 93000: NORMAL
P-R INTERVAL, ECG05: 164 MS
Q-T INTERVAL, ECG07: 368 MS
QRS DURATION, ECG06: 102 MS
QTC CALCULATION (BEZET), ECG08: 421 MS
VENTRICULAR RATE, ECG03: 79 BPM

## 2020-02-22 PROCEDURE — 99282 EMERGENCY DEPT VISIT SF MDM: CPT

## 2020-02-23 ENCOUNTER — HOSPITAL ENCOUNTER (EMERGENCY)
Age: 70
Discharge: HOME | End: 2020-02-23
Attending: EMERGENCY MEDICINE
Payer: MEDICARE

## 2020-02-23 VITALS
OXYGEN SATURATION: 96 % | TEMPERATURE: 98.2 F | BODY MASS INDEX: 31.47 KG/M2 | HEIGHT: 69 IN | DIASTOLIC BLOOD PRESSURE: 68 MMHG | HEART RATE: 87 BPM | RESPIRATION RATE: 18 BRPM | SYSTOLIC BLOOD PRESSURE: 132 MMHG | WEIGHT: 212.5 LBS

## 2020-02-23 DIAGNOSIS — J06.9 VIRAL URI WITH COUGH: Primary | ICD-10-CM

## 2020-02-23 DIAGNOSIS — Z91.14 NONCOMPLIANCE WITH MEDICATION REGIMEN: ICD-10-CM

## 2020-02-23 RX ORDER — BENZONATATE 100 MG/1
100 CAPSULE ORAL
Qty: 21 CAP | Refills: 0 | Status: SHIPPED | OUTPATIENT
Start: 2020-02-23 | End: 2020-03-01

## 2020-02-23 NOTE — ED NOTES
Pt presents to the ED c/o \"cold\" x1 day. Pt reports runny nose, productive cough. Pt denies the use of OTC medications. Pt reports smoking 1 pack/ day. Pt was found in waiting room after smoking a cigarette. Pt is A&Ox4. Pt lung sounds are clear bilaterally. No wheezing noted. Pt speaks in full and complete sentences. Pt has nasal drainage, and is blowing nose. Cough is strong and hacking. No sputum noted. Emergency Department Nursing Plan of Care       The Nursing Plan of Care is developed from the Nursing assessment and Emergency Department Attending provider initial evaluation. The plan of care may be reviewed in the ED Provider note.     The Plan of Care was developed with the following considerations:   Patient / Family readiness to learn indicated by:verbalized understanding  Persons(s) to be included in education: patient  Barriers to Learning/Limitations:No    Signed     Nkechi Castillo RN    2/23/2020   12:20 AM

## 2020-02-23 NOTE — DISCHARGE INSTRUCTIONS
Patient Education        Upper Respiratory Infection (Cold): Care Instructions  Your Care Instructions    An upper respiratory infection, or URI, is an infection of the nose, sinuses, or throat. URIs are spread by coughs, sneezes, and direct contact. The common cold is the most frequent kind of URI. The flu and sinus infections are other kinds of URIs. Almost all URIs are caused by viruses. Antibiotics won't cure them. But you can treat most infections with home care. This may include drinking lots of fluids and taking over-the-counter pain medicine. You will probably feel better in 4 to 10 days. The doctor has checked you carefully, but problems can develop later. If you notice any problems or new symptoms, get medical treatment right away. Follow-up care is a key part of your treatment and safety. Be sure to make and go to all appointments, and call your doctor if you are having problems. It's also a good idea to know your test results and keep a list of the medicines you take. How can you care for yourself at home? · To prevent dehydration, drink plenty of fluids, enough so that your urine is light yellow or clear like water. Choose water and other caffeine-free clear liquids until you feel better. If you have kidney, heart, or liver disease and have to limit fluids, talk with your doctor before you increase the amount of fluids you drink. · Take an over-the-counter pain medicine, such as acetaminophen (Tylenol), ibuprofen (Advil, Motrin), or naproxen (Aleve). Read and follow all instructions on the label. · Before you use cough and cold medicines, check the label. These medicines may not be safe for young children or for people with certain health problems. · Be careful when taking over-the-counter cold or flu medicines and Tylenol at the same time. Many of these medicines have acetaminophen, which is Tylenol. Read the labels to make sure that you are not taking more than the recommended dose.  Too much acetaminophen (Tylenol) can be harmful. · Get plenty of rest.  · Do not smoke or allow others to smoke around you. If you need help quitting, talk to your doctor about stop-smoking programs and medicines. These can increase your chances of quitting for good. When should you call for help? Call 911 anytime you think you may need emergency care. For example, call if:    · You have severe trouble breathing.    Call your doctor now or seek immediate medical care if:    · You seem to be getting much sicker.     · You have new or worse trouble breathing.     · You have a new or higher fever.     · You have a new rash.    Watch closely for changes in your health, and be sure to contact your doctor if:    · You have a new symptom, such as a sore throat, an earache, or sinus pain.     · You cough more deeply or more often, especially if you notice more mucus or a change in the color of your mucus.     · You do not get better as expected. Where can you learn more? Go to http://dank-damien.info/. Enter T699 in the search box to learn more about \"Upper Respiratory Infection (Cold): Care Instructions. \"  Current as of: June 9, 2019  Content Version: 12.2  © 5511-6298 [employer]. Care instructions adapted under license by [employer] (which disclaims liability or warranty for this information). If you have questions about a medical condition or this instruction, always ask your healthcare professional. [address] any warranty or liability for your use of this information.

## 2020-02-23 NOTE — ED PROVIDER NOTES
EMERGENCY DEPARTMENT HISTORY AND PHYSICAL EXAM      Date: 2/23/2020  Patient Name: Rachel Mcnamara    History of Presenting Illness     Chief Complaint   Patient presents with    Cough       History Provided By: Patient    HPI: Rachel Mcnamara, 79 y.o. male with PMHx significant for diabetes, COPD, DVT, who presents the chief complaint of cough and runny nose. Patient states that he has developed cough, rhinorrhea over the last day. He states that it is worse today. Reports that he is coughing up some white phlegm. Of note, patient does not take any medications for his chronic medical conditions. States that he stopped 2 years ago. Does have a nebulizer at home but does not use it. He did not take any over-the-counter medications for his symptoms. No chest pain, shortness of breath, fever, abdominal pain, nausea, vomiting. Continues to smoke 1 ppd      PCP: Lala Buchanan, NP    There are no other complaints, changes, or physical findings at this time. Current Outpatient Medications   Medication Sig Dispense Refill    benzonatate (TESSALON PERLES) 100 mg capsule Take 1 Cap by mouth three (3) times daily as needed for Cough for up to 7 days. 21 Cap 0    atorvastatin (LIPITOR) 20 mg tablet Take 1 Tab by mouth nightly. 90 Tab 0    nitroglycerin (NITROSTAT) 0.4 mg SL tablet 1 Tab by SubLINGual route every five (5) minutes as needed for Chest Pain. 15 Tab 0    BLOOD-GLUCOSE METER (FREESTYLE LITE METER) by Does Not Apply route.  glucose blood VI test strips (ASCENSIA AUTODISC VI, ONE TOUCH ULTRA TEST VI) strip Freestyle. Check sugar once daily fasting. E11.65 100 Strip 11    Lancets misc Use as directed. Dx: check sugar once daily. E11.65 freestyle 100 Each 11    aspirin delayed-release 81 mg tablet Take  by mouth daily.  fluticasone-salmeterol (ADVAIR) 100-50 mcg/dose diskus inhaler Take 1 Puff by inhalation every twelve (12) hours.       albuterol (PROVENTIL HFA, VENTOLIN HFA, PROAIR HFA) 90 mcg/actuation inhaler Take 2 Puffs by inhalation. Past History     Past Medical History:  Past Medical History:   Diagnosis Date    COPD     bronchitis    Diabetes ([address]     Personal history of DVT (deep vein thrombosis)      Past Surgical History:  Past Surgical History:   Procedure Laterality Date    CARDIAC SURG PROCEDURE UNLIST      HX CORONARY STENT PLACEMENT      twice, last 2015     Family History:  Family History   Problem Relation Age of Onset    Cancer Mother         breast    Heart Attack Mother      Social History:  Social History     Tobacco Use    Smoking status: Current Every Day Smoker     Packs/day: 1.00    Smokeless tobacco: Never Used   Substance Use Topics    Alcohol use: No     Comment: stopped 1994    Drug use: No     Types: Marijuana     Allergies:  No Known Allergies  Review of Systems   Review of Systems   Constitutional: Negative for chills and fever. HENT: Positive for congestion and rhinorrhea. Negative for sore throat. Respiratory: Positive for cough. Negative for shortness of breath. Cardiovascular: Negative for chest pain. Gastrointestinal: Negative for abdominal pain, nausea and vomiting. Genitourinary: Negative for dysuria and urgency. Skin: Negative for rash. Neurological: Negative for dizziness, light-headedness and headaches. All other systems reviewed and are negative. Physical Exam   Physical Exam  Vitals signs and nursing note reviewed. Constitutional:       General: He is not in acute distress. Appearance: He is well-developed. HENT:      Head: Normocephalic and atraumatic. Nose: Rhinorrhea present. Eyes:      Conjunctiva/sclera: Conjunctivae normal.      Pupils: Pupils are equal, round, and reactive to light. Neck:      Musculoskeletal: Normal range of motion. Cardiovascular:      Rate and Rhythm: Normal rate and regular rhythm. Pulmonary:      Effort: Pulmonary effort is normal. No respiratory distress. Breath sounds: Normal breath sounds. No stridor. No wheezing. Comments: Speaking in complete sentences  No increased work of breathing  Abdominal:      General: There is no distension. Palpations: Abdomen is soft. Tenderness: There is no abdominal tenderness. Musculoskeletal: Normal range of motion. Skin:     General: Skin is warm and dry. Neurological:      Mental Status: He is alert and oriented to person, place, and time. Diagnostic Study Results   Labs -   No results found for this or any previous visit (from the past 12 hour(s)). Radiologic Studies -   No orders to display     No results found. Medical Decision Making   I am the first provider for this patient. I reviewed the vital signs, available nursing notes, past medical history, past surgical history, family history and social history. Vital Signs-Reviewed the patient's vital signs. Patient Vitals for the past 12 hrs:   Temp Pulse Resp BP SpO2   02/22/20 2358 98.2 °F (36.8 °C) 87 18 132/68 96 %       Pulse Oximetry Analysis - 96% on ra    Records Reviewed: Nursing Notes and Old Medical Records    Provider Notes (Medical Decision Making): The patient complains of nasal congestion, rhinorrhea. Has non-productive cough without dyspnea or wheezing. Symptoms are consistent with an uncomplicated viral URI. Symptomatic therapy suggested: acetaminophen, ibuprofen, antihistamine-decongestant of choice, cough suppressant of choice. Increase fluids, use vaporizer, stay in steamy bathroom tid 15 min prn severe cough, tylenol as needed, rest, avoid smoky areas. Lack of antibiotic effectiveness discussed with him. Apply facial warm packs for sinus pain. Follow up prn if not better in 72 hours. ED Course:   Initial assessment performed. The patients presenting problems have been discussed, and they are in agreement with the care plan formulated and outlined with them.   I have encouraged them to ask questions as they arise throughout their visit. Critical Care:  none    Disposition:  Discharge Note:  12:38 AM  The patient has been re-evaluated and is ready for discharge. Reviewed available results with patient. Counseled patient on diagnosis and care plan. Patient has expressed understanding, and all questions have been answered. Patient agrees with plan and agrees to follow up as recommended, or to return to the ED if their symptoms worsen. Discharge instructions have been provided and explained to the patient, along with reasons to return to the ED. PLAN:  1. Discharge Medication List as of 2/23/2020 12:38 AM        2. Follow-up Information     Follow up With Specialties Details Why Contact Vinay Villa, NP Nurse Practitioner Schedule an appointment as soon as possible for a visit  [facility]  [address] [facility] [address]  [phone]      [address] [place] [address] EMERGENCY DEPT Emergency Medicine  As needed, If symptoms worsen [address] [facility]  [phone]        Return to ED if worse     Diagnosis     Clinical Impression:   1. Viral URI with cough    2. Noncompliance with medication regimen        This note will not be viewable in [employer] Please note that this dictation was completed with [employer], the computer voice recognition software. Quite often unanticipated grammatical, syntax, homophones, and other interpretive errors are inadvertently transcribed by the computer software. Please disregard these errors.   Please excuse any errors that have escaped final proofreading

## 2020-02-23 NOTE — ED TRIAGE NOTES
Patient here with cough x 1 day. Hx of COPD, has not taken anything for this today. States has nebulizer at home but \" I don't use that\".

## 2020-05-01 ENCOUNTER — TELEPHONE (OUTPATIENT)
Dept: INTERNAL MEDICINE CLINIC | Age: 70
End: 2020-05-01

## 2020-07-04 ENCOUNTER — HOSPITAL ENCOUNTER (EMERGENCY)
Age: 70
Discharge: HOME | End: 2020-07-04
Attending: EMERGENCY MEDICINE
Payer: MEDICARE

## 2020-07-04 VITALS
SYSTOLIC BLOOD PRESSURE: 133 MMHG | HEART RATE: 66 BPM | TEMPERATURE: 98 F | OXYGEN SATURATION: 95 % | RESPIRATION RATE: 16 BRPM | DIASTOLIC BLOOD PRESSURE: 60 MMHG

## 2020-07-04 DIAGNOSIS — K04.7 DENTAL INFECTION: Primary | ICD-10-CM

## 2020-07-04 DIAGNOSIS — Z72.0 TOBACCO ABUSE: ICD-10-CM

## 2020-07-04 DIAGNOSIS — E11.65 UNCONTROLLED TYPE 2 DIABETES MELLITUS WITH HYPERGLYCEMIA (HCC): ICD-10-CM

## 2020-07-04 DIAGNOSIS — K08.89 DENTALGIA: ICD-10-CM

## 2020-07-04 LAB
GLUCOSE BLD STRIP.AUTO-MCNC: 187 MG/DL (ref 65–100)
SERVICE CMNT-IMP: ABNORMAL

## 2020-07-04 PROCEDURE — 99283 EMERGENCY DEPT VISIT LOW MDM: CPT

## 2020-07-04 PROCEDURE — 74011000250 HC RX REV CODE- 250: Performed by: EMERGENCY MEDICINE

## 2020-07-04 PROCEDURE — 74011250637 HC RX REV CODE- 250/637: Performed by: EMERGENCY MEDICINE

## 2020-07-04 PROCEDURE — 82962 GLUCOSE BLOOD TEST: CPT

## 2020-07-04 RX ORDER — NAPROXEN 250 MG/1
500 TABLET ORAL ONCE
Status: COMPLETED | OUTPATIENT
Start: 2020-07-04 | End: 2020-07-04

## 2020-07-04 RX ORDER — PENICILLIN V POTASSIUM 250 MG/1
500 TABLET, FILM COATED ORAL
Status: COMPLETED | OUTPATIENT
Start: 2020-07-04 | End: 2020-07-04

## 2020-07-04 RX ORDER — PENICILLIN V POTASSIUM 500 MG/1
500 TABLET, FILM COATED ORAL 4 TIMES DAILY
Qty: 28 TAB | Refills: 0 | Status: SHIPPED | OUTPATIENT
Start: 2020-07-04 | End: 2020-07-11

## 2020-07-04 RX ORDER — TRAMADOL HYDROCHLORIDE 50 MG/1
50 TABLET ORAL
Qty: 10 TAB | Refills: 0 | Status: SHIPPED | OUTPATIENT
Start: 2020-07-04 | End: 2020-07-09

## 2020-07-04 RX ADMIN — DIPHENHYDRAMINE HYDROCHLORIDE: 12.5 LIQUID ORAL at 07:15

## 2020-07-04 RX ADMIN — NAPROXEN 500 MG: 250 TABLET ORAL at 07:20

## 2020-07-04 RX ADMIN — PENICILLIN V POTASIUM 500 MG: 250 TABLET ORAL at 07:15

## 2020-07-04 NOTE — ED NOTES
EMERGENCY DEPARTMENT ENCOUNTER      Room Number: 5/05      HPI:    Chief complaint: Shortness of breath and possible panic attack    Location: Event occurred at home    Quality/Severity: Severe at first and better now    Timing/Duration: Symptoms started at approximately 2050 hrs. this evening    Modifying Factors: None    Associated Symptoms: None    Narrative: Pt is a 33 y.o. male who presents complaining of shortness of breath and possible panic attack as noted above.  The patient relates that he was well all day and suddenly this evening he began to have shortness of breath.  No associated wheezing.  Patient was seen in our department on August 26 with similar complaints and was noted to have wheezing at that time.  Patient is scheduled to follow-up with pulmonology in 5 days.  Patient previously discharged on a short course of oral steroids which he completed 2 days ago.  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  However, the patient was riding about on the tractor for much of the day.  No history of lung disease and the patient is a non-smoker.      PMD: Stacie Caldwell APRN    REVIEW OF SYSTEMS  Review of Systems   Constitutional: Negative for activity change, appetite change and fever.   HENT: Negative for congestion and rhinorrhea.    Eyes: Negative for discharge, redness and itching.   Respiratory: Positive for shortness of breath. Negative for cough and wheezing.    Skin: Negative for rash.   Psychiatric/Behavioral: The patient is nervous/anxious.    All other systems reviewed and are negative.      PAST MEDICAL HISTORY  Active Ambulatory Problems     Diagnosis Date Noted   • Left lower quadrant pain 08/21/2017   • Left inguinal pain 08/21/2017     Resolved Ambulatory Problems     Diagnosis Date Noted   • No Resolved Ambulatory Problems     No Additional Past Medical History       PAST SURGICAL HISTORY  Past Surgical History:   Procedure Laterality  Bedside and Verbal shift change report given to DAE RN  (oncoming nurse) by CHELO RN  (offgoing nurse). Report included the following information SBAR, Kardex, ED Summary, Intake/Output, MAR and Recent Results. Patient here with c/o dental pain. Patient reports symptoms for 5 days, worsening last 2 days. Patient states constant pain, states not sleeping well due to pain. Patient denies fevers. Emergency Department Nursing Plan of Care       The Nursing Plan of Care is developed from the Nursing assessment and Emergency Department Attending provider initial evaluation. The plan of care may be reviewed in the ED Provider note.     The Plan of Care was developed with the following considerations:   Patient / Family readiness to learn indicated by:verbalized understanding  Persons(s) to be included in education: patient  Barriers to Learning/Limitations:No    Signed     Jose Guadalupe Dill RN    7/4/2020   7:32 AM Date   • HERNIA REPAIR     • WISDOM TOOTH EXTRACTION         FAMILY HISTORY  Family History   Problem Relation Age of Onset   • Hypertension Father    • Colon cancer Paternal Grandfather    • Cirrhosis Neg Hx        SOCIAL HISTORY  Social History     Socioeconomic History   • Marital status:      Spouse name: Not on file   • Number of children: Not on file   • Years of education: Not on file   • Highest education level: Not on file   Tobacco Use   • Smoking status: Never Smoker   Substance and Sexual Activity   • Alcohol use: Yes     Comment: socially   • Drug use: No       ALLERGIES  Bromfed dm [[id number]] and Wasp venom    PHYSICAL EXAM  ED Triage Vitals [09/03/19 2208]   Temp Heart Rate Resp BP SpO2   98.1 °F (36.7 °C) 91 (!) 40 161/76 98 %      Temp src Heart Rate Source Patient Position BP Location FiO2 (%)   Oral Monitor Lying Right arm --       Physical Exam   Constitutional: He is oriented to person, place, and time and well-developed, well-nourished, and in no distress.   HENT:   Head: Normocephalic and atraumatic.   Eyes: Conjunctivae and EOM are normal. Pupils are equal, round, and reactive to light.   Neck: Normal range of motion. Neck supple.   Cardiovascular: Normal rate and normal heart sounds.   No murmur heard.  Pulmonary/Chest: Effort normal and breath sounds normal. No respiratory distress.   Abdominal: Soft. Bowel sounds are normal. There is no tenderness.   Musculoskeletal: Normal range of motion. He exhibits no edema.   Neurological: He is alert and oriented to person, place, and time.   Skin: Skin is warm and dry.   Psychiatric: Affect and judgment normal.   Nursing note and vitals reviewed.      LAB RESULTS  Results for orders placed or performed during the hospital encounter of 08/26/19   Comprehensive Metabolic Panel   Result Value Ref Range    Glucose 97 65 - 99 mg/dL    BUN 11 6 - 20 mg/dL    Creatinine 1.03 0.76 - 1.27 mg/dL    Sodium 141 136 - 145 mmol/L    Potassium  4.0 3.5 - 5.2 mmol/L    Chloride 104 98 - 107 mmol/L    CO2 26.1 22.0 - 29.0 mmol/L    Calcium 9.6 8.6 - 10.5 mg/dL    Total Protein 6.8 6.0 - 8.5 g/dL    Albumin 4.30 3.50 - 5.20 g/dL    ALT (SGPT) 28 1 - 41 U/L    AST (SGOT) 23 1 - 40 U/L    Alkaline Phosphatase 49 39 - 117 U/L    Total Bilirubin 0.2 0.2 - 1.2 mg/dL    eGFR Non African Amer 83 >60 mL/min/1.73    Globulin 2.5 gm/dL    A/G Ratio 1.7 g/dL    BUN/Creatinine Ratio 10.7 7.0 - 25.0    Anion Gap 10.9 5.0 - 15.0 mmol/L   D-dimer, Quantitative   Result Value Ref Range    D-Dimer, Quantitative 0.48 (H) 0.00 - 0.46 MCGFEU/mL   Troponin   Result Value Ref Range    Troponin T <0.010 0.000-<0.030 ng/mL   CBC Auto Differential   Result Value Ref Range    WBC 5.89 3.40 - 10.80 10*3/mm3    RBC 5.17 4.14 - 5.80 10*6/mm3    Hemoglobin 15.5 13.0 - 17.7 g/dL    Hematocrit 45.3 37.5 - 51.0 %    MCV 87.6 79.0 - 97.0 fL    MCH 30.0 26.6 - 33.0 pg    MCHC 34.2 31.5 - 35.7 g/dL    RDW 12.6 12.3 - 15.4 %    RDW-SD 40.7 37.0 - 54.0 fl    MPV 11.8 6.0 - 12.0 fL    Platelets 229 140 - 450 10*3/mm3    Neutrophil % 42.6 (L) 42.7 - 76.0 %    Lymphocyte % 38.7 19.6 - 45.3 %    Monocyte % 14.1 (H) 5.0 - 12.0 %    Eosinophil % 3.6 0.3 - 6.2 %    Basophil % 0.8 0.0 - 1.5 %    Immature Grans % 0.2 0.0 - 0.5 %    Neutrophils, Absolute 2.51 1.70 - 7.00 10*3/mm3    Lymphocytes, Absolute 2.28 0.70 - 3.10 10*3/mm3    Monocytes, Absolute 0.83 0.10 - 0.90 10*3/mm3    Eosinophils, Absolute 0.21 0.00 - 0.40 10*3/mm3    Basophils, Absolute 0.05 0.00 - 0.20 10*3/mm3    Immature Grans, Absolute 0.01 0.00 - 0.05 10*3/mm3    nRBC 0.0 0.0 - 0.2 /100 WBC   Scan Slide   Result Value Ref Range    RBC Morphology Normal Normal    WBC Morphology Normal Normal    Platelet Morphology Normal Normal         I ordered the above labs and reviewed the results    RADIOLOGY  Xr Neck Soft Tissue    Result Date: 8/26/2019  Narrative: Exam: AP and lateral soft tissue neck INDICATION: Wheezing cough starting at 9:00  tonight with difficulty swallowing FINDINGS: AP and lateral views the neck appear normal. No foreign bodies identified. Soft tissues are normal.     Impression: Normal Signer Name: Rip Townsend MD  Signed: 8/26/2019 11:46 PM  Workstation Name: [facility]  Radiology [facility]    Xr Chest 2 View    Result Date: 8/26/2019  Narrative: CR Chest 2 Vws INDICATION:  Shows severe wheezing starting tonight COMPARISON:  None. FINDINGS: PA and lateral views of the chest.  Heart and mediastinal contours are normal. The lungs are clear. No pneumothorax or pleural effusion.      Impression: No acute cardiopulmonary findings. Signer Name: Rip Townsend MD  Signed: 8/26/2019 11:46 PM  Workstation Name: [facility]    Ct Angiogram Chest With Contrast    Result Date: 8/27/2019  Narrative: CTA Chest INDICATION: Shortness of air and wheezing starting at 9:00 PM tonight with some chest pain earlier TECHNIQUE: CT angiogram of the chest with IV contrast. 3-D reconstructions were obtained and reviewed.   Radiation dose reduction techniques included automated exposure control or exposure modulation based on body size. Count of known CT and cardiac nuc med studies performed in previous 12 months: 0. COMPARISON: None available. FINDINGS: The lungs are clear. The thyroid gland is normal. There is normal in size. There is no dissection. There is adequate opacification of the pulmonary arteries and there is no CT evidence of pulmonary embolus. No adenopathy is identified. Upper abdominal images are unremarkable. Bones are unremarkable.     Impression: 1. Negative for pulmonary embolus. 2. No acute findings in the chest. Signer Name: Rip Townsend MD  Signed: 8/27/2019 12:39 AM  Workstation Name: [facility]      I ordered the above radiologic testing and reviewed the results    PROCEDURES  Procedures      PROGRESS AND CONSULTS  ED Course as of Sep 04 0443   Wed Sep  04, 2019   0442 By the time the patient was evaluated his symptoms had resolved.  Previous visit reviewed and the patient did have a normal chest CT at that time.  Patient advised to use his albuterol inhaler every 4-5 hours.  As the patient is scheduled for pulmonology follow-up other medications were not prescribed as they could possibly interfere with his PFTs.  [ML]      ED Course User Index  [ML] Geraldo Douglas MD         Final Diagnosis: as of Sep 04 0443   Acute hyperventilation syndrome           MEDICAL DECISION MAKING  Results were reviewed/discussed with the patient and they were also made aware of online access. Pt also made aware that some labs, such as cultures, will not be resulted during ER visit and follow up with PMD is necessary.     MDM  Number of Diagnoses or Management Options  Acute hyperventilation syndrome: new and requires workup     Amount and/or Complexity of Data Reviewed  Review and summarize past medical records: yes    Risk of Complications, Morbidity, and/or Mortality  Presenting problems: high  Management options: moderate    Patient Progress  Patient progress: improved         DIAGNOSIS  Final diagnoses:   Acute hyperventilation syndrome       Latest Documented Vital Signs:  As of 4:43 AM  BP- 161/76 HR- 91 Temp- 98.1 °F (36.7 °C) (Oral) O2 sat- 98%    DISPOSITION  Discharged in good condition       Medication List      Stop    predniSONE 10 MG tablet  Commonly known as:  DELTASONE        My differential diagnosis for dyspnea includes but is not limited to:  Asthma, COPD, pneumonia, pulmonary embolus, acute respiratory distress syndrome, pneumothorax, pleural effusion, pulmonary fibrosis, congestive heart failure, myocardial infarction, DKA, uremia, acidosis, sepsis, anemia, drug related, hyperventilation, CNS disease    Follow-up Information     Stacie Caldwell APRN.    Specialty:  Family Medicine  Contact information:  [address]  [facility] [address]  [place]  [address]  [phone]                      Geraldo Douglas MD  09/04/19 0444

## 2020-07-04 NOTE — ED PROVIDER NOTES
EMERGENCY DEPARTMENT HISTORY AND PHYSICAL EXAM      Please note that this dictation was completed with [employer], the computer voice recognition software. Quite often unanticipated grammatical, syntax, homophones, and other interpretive errors are inadvertently transcribed by the computer software. Please disregard these errors and any errors that have escaped final proofreading. Thank you. Date: 7/4/2020  Patient Name: Jyothi Chaudhry  Patient Age and Sex: 79 y.o. male    History of Presenting Illness     Chief Complaint   Patient presents with    Dental Pain    Fatigue       History Provided By: Patient    HPI: Jyothi Chaudhry, 79 y.o. male with past medical history as documented below presents to the ED with c/o of acute onset of 2 days of right lower dental pain and swelling. Pt states pain is mild to moderate, achy and throbbing in quality. He notes pain is worse with eating cold foods. He denies voice changes, dysphagia, neck swelling, fevers or chills. He has tried no medications yet for his sx's. Pt denies any other alleviating or exacerbating factors. Additionally, pt specifically denies any recent fever, chills, headache, nausea, vomiting, abdominal pain, CP, SOB, lightheadedness, dizziness, numbness, weakness, lower extremity swelling, heart palpitations, urinary sxs, diarrhea, constipation, melena, hematochezia, cough, or congestion. There are no other complaints, changes or physical findings at this time.      PCP: Мария Tan, KATHARINA    Past History   Past Medical History:  Past Medical History:   Diagnosis Date    COPD     bronchitis    Diabetes ([facility] [address]     Personal history of DVT (deep vein thrombosis)        Past Surgical History:  Past Surgical History:   Procedure Laterality Date    CARDIAC SURG PROCEDURE UNLIST      HX CORONARY STENT PLACEMENT      twice, last 2015       Family History:  Family History   Problem Relation Age of Onset    Cancer Mother         breast    Heart Attack Mother        Social History:  Social History     Tobacco Use    Smoking status: Current Every Day Smoker     Packs/day: 1.00    Smokeless tobacco: Never Used   Substance Use Topics    Alcohol use: No     Comment: stopped 1994    Drug use: No     Types: Marijuana       Allergies:  No Known Allergies    Current Medications:  No current facility-administered medications on file prior to encounter. Current Outpatient Medications on File Prior to Encounter   Medication Sig Dispense Refill    atorvastatin (LIPITOR) 20 mg tablet Take 1 Tab by mouth nightly. 90 Tab 0    [DISCONTINUED] rivaroxaban (XARELTO) 10 mg tablet Take 1 Tab by mouth two (2) times daily (with meals). Indications: PREVENTION OF PULMONARY THROMBOEMBOLISM RECURRENCE 60 Tab 12    nitroglycerin (NITROSTAT) 0.4 mg SL tablet 1 Tab by SubLINGual route every five (5) minutes as needed for Chest Pain. 15 Tab 0    BLOOD-GLUCOSE METER (FREESTYLE LITE METER) by Does Not Apply route.  glucose blood VI test strips (ASCENSIA AUTODISC VI, ONE TOUCH ULTRA TEST VI) strip Freestyle. Check sugar once daily fasting. E11.65 100 Strip 11    Lancets misc Use as directed. Dx: check sugar once daily. E11.65 freestyle 100 Each 11    aspirin delayed-release 81 mg tablet Take  by mouth daily.  fluticasone-salmeterol (ADVAIR) 100-50 mcg/dose diskus inhaler Take 1 Puff by inhalation every twelve (12) hours.  albuterol (PROVENTIL HFA, VENTOLIN HFA, PROAIR HFA) 90 mcg/actuation inhaler Take 2 Puffs by inhalation. Review of Systems   Review of Systems   Constitutional: Negative. Negative for chills and fever. HENT: Positive for dental problem. Negative for congestion, facial swelling, rhinorrhea, sore throat, trouble swallowing and voice change. Eyes: Negative. Respiratory: Negative. Negative for apnea, cough, chest tightness, shortness of breath and wheezing. Cardiovascular: Negative.   Negative for chest pain, palpitations and leg swelling. Gastrointestinal: Negative. Negative for abdominal distention, abdominal pain, blood in stool, constipation, diarrhea, nausea and vomiting. Endocrine: Negative. Negative for cold intolerance, heat intolerance and polyuria. Genitourinary: Negative. Negative for difficulty urinating, dysuria, flank pain, frequency, hematuria and urgency. Musculoskeletal: Negative. Negative for arthralgias, back pain, myalgias, neck pain and neck stiffness. Skin: Negative. Negative for color change and rash. Neurological: Negative. Negative for dizziness, syncope, facial asymmetry, speech difficulty, weakness, light-headedness, numbness and headaches. Hematological: Negative. Does not bruise/bleed easily. Psychiatric/Behavioral: Negative. Negative for confusion and self-injury. The patient is not nervous/anxious. Physical Exam   Physical Exam  Vitals signs and nursing note reviewed. Constitutional:       Appearance: He is well-developed. He is not toxic-appearing. HENT:      Head: Normocephalic and atraumatic. Comments: TTP tooth #28, partially edentulous, dental caries noted, min induration, no facial swelling or abscess noted, floor of mouth soft, tongue mobile, no overlying skin changes over neck, no trismus     Mouth/Throat:      Pharynx: No posterior oropharyngeal erythema. Eyes:      Conjunctiva/sclera: Conjunctivae normal.      Pupils: Pupils are equal, round, and reactive to light. Neck:      Musculoskeletal: Normal range of motion. Cardiovascular:      Rate and Rhythm: Normal rate and regular rhythm. Heart sounds: Normal heart sounds. No murmur. No friction rub. No gallop. Pulmonary:      Effort: Pulmonary effort is normal. No respiratory distress. Breath sounds: Normal breath sounds. No wheezing or rales. Chest:      Chest wall: No tenderness. Abdominal:      General: Bowel sounds are normal. There is no distension. Palpations: Abdomen is soft. There is no mass. Tenderness: There is no abdominal tenderness. There is no guarding or rebound. Musculoskeletal: Normal range of motion. General: No tenderness or deformity. Skin:     General: Skin is warm. Findings: No rash. Neurological:      Mental Status: He is alert and oriented to person, place, and time. Cranial Nerves: No cranial nerve deficit. Motor: No abnormal muscle tone. Coordination: Coordination normal.      Deep Tendon Reflexes: Reflexes normal.   Psychiatric:         Behavior: Behavior is cooperative. Diagnostic Study Results     Labs -  Recent Results (from the past 24 hour(s))   GLUCOSE, POC    Collection Time: 07/04/20  7:18 AM   Result Value Ref Range    Glucose (POC) 187 (H) 65 - 100 mg/dL    Performed by Rivka Brandon        Radiologic Studies -   No orders to display     CT Results  (Last 48 hours)    None        CXR Results  (Last 48 hours)    None          Medical Decision Making   I am the first provider for this patient. I reviewed the vital signs, available nursing notes, past medical history, past surgical history, family history and social history. Vital Signs-Reviewed the patient's vital signs. Patient Vitals for the past 24 hrs:   Temp Pulse Resp BP SpO2   07/04/20 0652 98 °F (36.7 °C) 66 16 133/60 95 %       Pulse Oximetry Analysis - 95% on RA    Cardiac Monitor:   Rate: 66 bpm  Rhythm: Normal Sinus Rhythm      Records Reviewed: Nursing Notes, Old Medical Records, Previous electrocardiograms, Previous Radiology Studies and Previous Laboratory Studies    Provider Notes (Medical Decision Making):   Patient presents with dental pain. Stable vitals and exam without obvious abscess that needs drainage. Airway stable and patient speaking in full sentences. No red flags that make PTA, RPA, ludwigs angina concerning. Will tx with dental ball, antibiotics and outpatient analgesics.  Given information on dentists and importance of followup and no smoking    ED Course:   Initial assessment performed. The patients presenting problems have been discussed, and they are in agreement with the care plan formulated and outlined with them. I have encouraged them to ask questions as they arise throughout their visit. TOBACCO COUNSELING:  Upon evaluation, pt expressed that they are a current tobacco user. For approximately 10 minutes, pt has been counseled on the dangers of smoking and was encouraged to quit as soon as possible in order to decrease further risks to their health. Pt has conveyed their understanding of the risks involved should they continue to use tobacco products. ALCOHOL/SUBSTANCE ABUSE COUNSELING:  Upon evaluation, pt endorsed recent alcohol/illicit drug use. For approximately 7 minutes, pt has been counseled on the dangers of alcohol and illicit drug use on their health, and they were encouraged to quit as soon as possible in order to decrease further risks to their health. Pt has conveyed their understanding of the risks involved should they continue to use these products. I reviewed our electronic medical record system for any past medical records that were available that may contribute to the patient's current condition, the nursing notes and vital signs from today's visit.   Nicola Vang MD    ED Orders Placed :  Orders Placed This Encounter    POC GLUCOSE    GLUCOSE, POC    dental ball (lidocaine/Benadryl/Cetacaine) mixture    penicillin v potassium (VEETID) tablet 500 mg    naproxen (NAPROSYN) tablet 500 mg    penicillin v potassium (VEETID) 500 mg tablet    traMADoL (ULTRAM) 50 mg tablet    benzocaine 20 % mucosal liquid     ED Medications Administered:  Medications   dental ball (lidocaine/Benadryl/Cetacaine) mixture ( Mucous Membrane Given 7/4/20 0715)   penicillin v potassium (VEETID) tablet 500 mg (500 mg Oral Given 7/4/20 0715)   naproxen (NAPROSYN) tablet 500 mg (500 mg Oral Given 7/4/20 0720)        Progress Note:  Patient has been reassessed and reports feeling better and symptoms have improved significantly after ED treatment. Patient feels comfortable going home with close follow-up. Maricel Link's final labs and imaging have been reviewed with him and available family and/or caregiver. They have been counseled regarding his diagnosis. He verbally conveys understanding and agreement of the signs, symptoms, diagnosis, treatment and prognosis and additionally agrees to follow up as recommended with Dr. Indiana Valdivia, NP and/or specialist in 24 - 48 hours. He also agrees with the care-plan we created together and conveys that all of his questions have been answered. I have also put together some discharge instructions for him that include: 1) educational information regarding their diagnosis, 2) how to care for their diagnosis at home, as well a 3) list of reasons why they would want to return to the ED prior to their follow-up appointment should the patient's condition change or symptoms worsen. I have answered all questions to the patient's satisfaction. Strict return precautions given. He both understood and agreed with plan as discussed. Vital signs stable for discharge. Pt very appreciative of care today. Disposition: Discharge  The pt is ready for discharge. The pt's signs, symptoms, diagnosis, and discharge instructions have been discussed and pt has conveyed their understanding. The pt is to follow up as recommended or return to ER should their symptoms worsen. Plan has been discussed and pt is in full agreement. Plan:  1. Return precautions as discussed. 2.   Discharge Medication List as of 7/4/2020  7:11 AM      START taking these medications    Details   penicillin v potassium (VEETID) 500 mg tablet Take 1 Tab by mouth four (4) times daily for 7 days. , Print, Disp-28 Tab, R-0      traMADoL (ULTRAM) 50 mg tablet Take 1 Tab by mouth every six (6) hours as needed for Pain for up to 5 days. Max Daily Amount: 200 mg. Indications: pain, Print, Disp-10 Tab, R-0      benzocaine 20 % mucosal liquid Use as directed, Print, Disp-1 Bottle, R-0         CONTINUE these medications which have NOT CHANGED    Details   atorvastatin (LIPITOR) 20 mg tablet Take 1 Tab by mouth nightly., Normal, Disp-90 Tab, R-0      nitroglycerin (NITROSTAT) 0.4 mg SL tablet 1 Tab by SubLINGual route every five (5) minutes as needed for Chest Pain., Normal, Disp-15 Tab, R-0      BLOOD-GLUCOSE METER (FREESTYLE LITE METER) by Does Not Apply route., Historical Med      glucose blood VI test strips (ASCENSIA AUTODISC VI, ONE TOUCH ULTRA TEST VI) strip Freestyle. Check sugar once daily fasting. E11.65, Normal, Disp-100 Strip, R-11      Lancets misc Use as directed. Dx: check sugar once daily. E11.65 freestyle, Normal, Disp-100 Each, R-11      aspirin delayed-release 81 mg tablet Take  by mouth daily. , Historical Med      fluticasone-salmeterol (ADVAIR) 100-50 mcg/dose diskus inhaler Take 1 Puff by inhalation every twelve (12) hours. , Historical Med      albuterol (PROVENTIL HFA, VENTOLIN HFA, PROAIR HFA) 90 mcg/actuation inhaler Take 2 Puffs by inhalation. , Historical Med         STOP taking these medications       rivaroxaban (XARELTO) 10 mg tablet Comments:   Reason for Stopping:             3.   Follow-up Information     Follow up With Specialties Details Why Contact Info    Grazyna Garnett NP Nurse Practitioner   [facility] [place]  [facility] [address] [address] [phone]      [facility] EMERGENCY DEPT Emergency Medicine  As needed, If symptoms worsen [address]          Instructed to return to ED if worse  Diagnosis     Clinical Impression:   1. Dental infection    2. Dentalgia    3. Uncontrolled type 2 diabetes mellitus with hyperglycemia ([address]    4. Tobacco abuse      Attestation:  Erna Chapman MD, am the attending of record for this patient.  I personally performed the services described in this documentation on this date, 7/4/2020 for patient, Hiwot De Los Santos. I have reviewed the chart and verified that the record is accurate and complete. This note will not be viewable in [address]

## 2020-07-04 NOTE — ED TRIAGE NOTES
Pt reports R lower dental pain X 2 days w/ swelling. Pt walked to room stumbling. I questioned pt and he reports feeling weak b/c he is a diabetic and hasn't eaten or slept b/c of his tooth.

## 2020-07-04 NOTE — DISCHARGE INSTRUCTIONS
Emergency [address] by [address]AM[facility]AN[facility]  [address] [facility], [address] [facility]  Open M, W, F: 8AM - 5PM and T, Th: 8AM-6PM  Phone: [phone], press 4  $70 for Emergency Care  $60 for first routine care, then pay by sliding scale based upon income. [facility] [address], [address]  Phone: [phone]    [employer]  [address] [facility], [address]  Open Monday - Friday 8AM - 4:30 PM  Phone: [phone] of Dentistry Urgent [address] [facility], [address] [facility], [address] [facility] [address]   Phone: [phone] to confirm a time for emergency treatment   Pediatrics: [phone]   $75 per tooth, extractions only     [address] [facility], [address] [facility], [facility] [address], [address]nd Floor, [address] [place] [address] starting at 8:30 AM - 3 PM [address] [place] [address] So[address] [place], [address] [address]   Phone [phone] or [phone]   Hours [id number] (extractions)   Simple tooth extraction: $60 per tooth, $55 per x-ray     [facility] Residents only, over the age of 25  Phone: [phone]. Leave message saying you need an appointment to register. Hours: Tuesday Evenings     [facility] (in [place])   [facility] AT [place] only   Phone: [phone], leave message saying you need an appointment to register   Hours: Wed 6-9p     Non-Urgent Maurizio GARCIA [address] [facility] at [address] [facility]   Dental Clinic: [phone]   Oral Surgery Clinic: [phone]

## 2020-08-02 ENCOUNTER — HOSPITAL ENCOUNTER (EMERGENCY)
Age: 70
Discharge: HOME | End: 2020-08-02
Attending: EMERGENCY MEDICINE
Payer: MEDICARE

## 2020-08-02 ENCOUNTER — APPOINTMENT (OUTPATIENT)
Dept: GENERAL RADIOLOGY | Age: 70
End: 2020-08-02
Attending: EMERGENCY MEDICINE
Payer: MEDICARE

## 2020-08-02 VITALS
HEIGHT: 69 IN | DIASTOLIC BLOOD PRESSURE: 65 MMHG | HEART RATE: 66 BPM | TEMPERATURE: 97.5 F | OXYGEN SATURATION: 94 % | WEIGHT: 212 LBS | RESPIRATION RATE: 20 BRPM | BODY MASS INDEX: 31.4 KG/M2 | SYSTOLIC BLOOD PRESSURE: 117 MMHG

## 2020-08-02 DIAGNOSIS — S90.414A ABRASION OF TOE OF RIGHT FOOT, INITIAL ENCOUNTER: ICD-10-CM

## 2020-08-02 DIAGNOSIS — S90.221A CONTUSION OF LESSER TOE OF RIGHT FOOT WITH DAMAGE TO NAIL, INITIAL ENCOUNTER: Primary | ICD-10-CM

## 2020-08-02 PROCEDURE — 90471 IMMUNIZATION ADMIN: CPT

## 2020-08-02 PROCEDURE — 99283 EMERGENCY DEPT VISIT LOW MDM: CPT

## 2020-08-02 PROCEDURE — 73630 X-RAY EXAM OF FOOT: CPT

## 2020-08-02 PROCEDURE — 74011250636 HC RX REV CODE- 250/636: Performed by: EMERGENCY MEDICINE

## 2020-08-02 PROCEDURE — 90715 TDAP VACCINE 7 YRS/> IM: CPT | Performed by: EMERGENCY MEDICINE

## 2020-08-02 PROCEDURE — 74011250637 HC RX REV CODE- 250/637: Performed by: EMERGENCY MEDICINE

## 2020-08-02 RX ORDER — IBUPROFEN 800 MG/1
800 TABLET ORAL
Qty: 20 TAB | Refills: 0 | Status: SHIPPED | OUTPATIENT
Start: 2020-08-02 | End: 2020-08-09

## 2020-08-02 RX ORDER — IBUPROFEN 400 MG/1
800 TABLET ORAL
Status: COMPLETED | OUTPATIENT
Start: 2020-08-02 | End: 2020-08-02

## 2020-08-02 RX ADMIN — IBUPROFEN 800 MG: 400 TABLET, FILM COATED ORAL at 08:46

## 2020-08-02 RX ADMIN — TETANUS TOXOID, REDUCED DIPHTHERIA TOXOID AND ACELLULAR PERTUSSIS VACCINE, ADSORBED 0.5 ML: 5; 2.5; 8; 8; 2.5 SUSPENSION INTRAMUSCULAR at 08:46

## 2020-08-02 NOTE — ED PROVIDER NOTES
EMERGENCY DEPARTMENT HISTORY AND PHYSICAL EXAM      Date: 8/2/2020  Patient Name: Wannetta Hamman    History of Presenting Illness     Chief Complaint   Patient presents with    Foot Injury     r       History Provided By: Patient    HPI: Wannetta Hamman, 79 y.o. male with PMHx significant for COPD, DM, presents via EMS to the ED with cc of mild to moderate R 2nd toe pain since yesterday. Denies any associated symptoms. Denies any alleviating or exacerbating factors. States he thinks he hit his toe on something last night. Unsure of tetanus. Reports hx of DM but states not currently taking anything for it. Pt specifically denies any fever, chills, CP, SOB, abd pain, NVD. There are no other complaints, changes, or physical findings at this time. PCP: Sam Carcamo., NP    No current facility-administered medications on file prior to encounter. Current Outpatient Medications on File Prior to Encounter   Medication Sig Dispense Refill    benzocaine 20 % mucosal liquid Use as directed 1 Bottle 0    atorvastatin (LIPITOR) 20 mg tablet Take 1 Tab by mouth nightly. 90 Tab 0    [DISCONTINUED] rivaroxaban (XARELTO) 10 mg tablet Take 1 Tab by mouth two (2) times daily (with meals). Indications: PREVENTION OF PULMONARY THROMBOEMBOLISM RECURRENCE 60 Tab 12    nitroglycerin (NITROSTAT) 0.4 mg SL tablet 1 Tab by SubLINGual route every five (5) minutes as needed for Chest Pain. 15 Tab 0    BLOOD-GLUCOSE METER (FREESTYLE LITE METER) by Does Not Apply route.  glucose blood VI test strips (ASCENSIA AUTODISC VI, ONE TOUCH ULTRA TEST VI) strip Freestyle. Check sugar once daily fasting. E11.65 100 Strip 11    Lancets misc Use as directed. Dx: check sugar once daily. E11.65 freestyle 100 Each 11    aspirin delayed-release 81 mg tablet Take  by mouth daily.  fluticasone-salmeterol (ADVAIR) 100-50 mcg/dose diskus inhaler Take 1 Puff by inhalation every twelve (12) hours.       albuterol (PROVENTIL HFA, VENTOLIN HFA, PROAIR HFA) 90 mcg/actuation inhaler Take 2 Puffs by inhalation. Past History     Past Medical History:  Past Medical History:   Diagnosis Date    COPD     bronchitis    Diabetes ([address]     Personal history of DVT (deep vein thrombosis)        Past Surgical History:  Past Surgical History:   Procedure Laterality Date    CARDIAC SURG PROCEDURE UNLIST      HX CORONARY STENT PLACEMENT      twice, last 2015       Family History:  Family History   Problem Relation Age of Onset    Cancer Mother         breast    Heart Attack Mother        Social History:  Social History     Tobacco Use    Smoking status: Current Every Day Smoker     Packs/day: 1.00    Smokeless tobacco: Never Used   Substance Use Topics    Alcohol use: No     Comment: stopped 1994    Drug use: No     Types: Marijuana       Allergies:  No Known Allergies      Review of Systems   Review of Systems   Constitutional: Negative for fever. HENT: Negative for sore throat and trouble swallowing. Eyes: Negative for photophobia and redness. Respiratory: Negative for cough and shortness of breath. Cardiovascular: Negative for chest pain and leg swelling. Gastrointestinal: Negative for abdominal pain, constipation, diarrhea, nausea and vomiting. Endocrine: Negative for polydipsia and polyuria. Genitourinary: Negative for dysuria, hematuria and scrotal swelling. Musculoskeletal: Positive for myalgias. Negative for back pain and joint swelling. Skin: Negative for rash. Neurological: Negative for dizziness, syncope, weakness and headaches. Psychiatric/Behavioral: Negative for suicidal ideas. All other systems reviewed and are negative. Physical Exam   Physical Exam  Vitals signs and nursing note reviewed. Constitutional:       General: He is not in acute distress. Appearance: He is well-developed. HENT:      Head: Normocephalic and atraumatic.    Eyes:      Conjunctiva/sclera: Conjunctivae normal. Pupils: Pupils are equal, round, and reactive to light. Neck:      Musculoskeletal: Normal range of motion and neck supple. Cardiovascular:      Rate and Rhythm: Normal rate and regular rhythm. Heart sounds: Normal heart sounds. Pulmonary:      Effort: Pulmonary effort is normal. No respiratory distress. Breath sounds: Normal breath sounds. No wheezing. Abdominal:      General: Bowel sounds are normal. There is no distension. Palpations: Abdomen is soft. Tenderness: There is no abdominal tenderness. Musculoskeletal:      Comments: Swelling R 2nd toe, around nail bed with abrasion, no subungal hematoma    Skin:     General: Skin is warm and dry. Findings: No rash. Neurological:      Mental Status: He is alert and oriented to person, place, and time. Cranial Nerves: No cranial nerve deficit. Psychiatric:         Behavior: Behavior normal.         Diagnostic Study Results     Labs -   No results found for this or any previous visit (from the past 12 hour(s)). Radiologic Studies -   XR FOOT RT MIN 3 V   Final Result   IMPRESSION: No acute abnormality. CT Results  (Last 48 hours)    None        CXR Results  (Last 48 hours)    None            Medical Decision Making   I am the first provider for this patient. I reviewed the vital signs, available nursing notes, past medical history, past surgical history, family history and social history. Vital Signs-Reviewed the patient's vital signs. Patient Vitals for the past 12 hrs:   Temp Pulse Resp BP SpO2   08/02/20 0810 97.5 °F (36.4 °C) 66 20 117/65 94 %     Records Reviewed: Nursing Notes and Old Medical Records    Provider Notes (Medical Decision Making):   DDx: fx, contusion, abrasion    ED Course:   Initial assessment performed. The patients presenting problems have been discussed, and they are in agreement with the care plan formulated and outlined with them.   I have encouraged them to ask questions as they arise throughout their visit. Critical Care Time:   none    Disposition:  DISCHARGE  8:58 AM  The patient has been re-evaluated and is ready for discharge. Reviewed available results with patient. Counseled pt on diagnosis and care plan. Pt has expressed understanding, and all questions have been answered. Pt agrees with plan and agrees to follow up as recommended, or return to the ED if their symptoms worsen. Discharge instructions have been provided and explained to the pt, along with reasons to return to the ED. PLAN:  1. Current Discharge Medication List      START taking these medications    Details   ibuprofen (MOTRIN) 800 mg tablet Take 1 Tab by mouth every six (6) hours as needed for Pain for up to 7 days. Qty: 20 Tab, Refills: 0           2. Follow-up Information     Follow up With Specialties Details Why Contact Info    Shantanu Crook, NP Nurse Practitioner In 1 week  [place]  [address]  [phone]          Return to ED if worse     Diagnosis     Clinical Impression:   1. Contusion of lesser toe of right foot with damage to nail, initial encounter    2. Abrasion of toe of right foot, initial encounter        Attestations: This note is prepared by Stanley Katz, acting as Scribe for Milly John MD.    Milly John MD: The scribe's documentation has been prepared under my direction and personally reviewed by me in its entirety. I confirm that the note above accurately reflects all work, treatment, procedures, and medical decision making performed by me.

## 2020-08-02 NOTE — ED NOTES
Patient here with c/o right side 2nd toe pain. Patient reports symptoms started yesterday evening. Patient states that it was hurting him and then he noticed some redness, states that he applied manual pressure with this hands to express some blood which he states gave him minor relief. Patient denies any known injuries or trauma. Patient with hx of diabetes, non-compliant. Patient denies fevers. Patient states that he attempted to remove his toenail, states he has been seen by podiatrist in the past who had removed toanails from his great toes. Emergency Department Nursing Plan of Care       The Nursing Plan of Care is developed from the Nursing assessment and Emergency Department Attending provider initial evaluation. The plan of care may be reviewed in the ED Provider note.     The Plan of Care was developed with the following considerations:   Patient / Family readiness to learn indicated by:verbalized understanding  Persons(s) to be included in education: patient  Barriers to Learning/Limitations:No    Signed     Oliver Blair RN    8/2/2020   9:09 AM

## 2020-08-02 NOTE — ED TRIAGE NOTES
Patient reports he stopped taking his medicines about 2 years ago, pt states, \"I was taking blood thinners and metformin and other stuff. \" pt reports unknown injury to right foot, 2nd toenail is bloody.

## 2021-03-10 ENCOUNTER — APPOINTMENT (OUTPATIENT)
Dept: CT IMAGING | Age: 71
End: 2021-03-10
Attending: PHYSICIAN ASSISTANT
Payer: MEDICARE

## 2021-03-10 ENCOUNTER — APPOINTMENT (OUTPATIENT)
Dept: GENERAL RADIOLOGY | Age: 71
End: 2021-03-10
Attending: PHYSICIAN ASSISTANT
Payer: MEDICARE

## 2021-03-10 ENCOUNTER — HOSPITAL ENCOUNTER (EMERGENCY)
Age: 71
Discharge: HOME | End: 2021-03-10
Attending: EMERGENCY MEDICINE
Payer: MEDICARE

## 2021-03-10 VITALS
HEIGHT: 69 IN | OXYGEN SATURATION: 95 % | BODY MASS INDEX: 31.4 KG/M2 | RESPIRATION RATE: 17 BRPM | TEMPERATURE: 98.4 F | HEART RATE: 71 BPM | WEIGHT: 212 LBS | DIASTOLIC BLOOD PRESSURE: 65 MMHG | SYSTOLIC BLOOD PRESSURE: 125 MMHG

## 2021-03-10 DIAGNOSIS — R42 INTERMITTENT LIGHTHEADEDNESS: ICD-10-CM

## 2021-03-10 DIAGNOSIS — G31.9 CEREBRAL ATROPHY (HCC): Primary | ICD-10-CM

## 2021-03-10 DIAGNOSIS — I25.119 CORONARY ARTERY DISEASE INVOLVING NATIVE HEART WITH ANGINA PECTORIS, UNSPECIFIED VESSEL OR LESION TYPE (HCC): ICD-10-CM

## 2021-03-10 DIAGNOSIS — Z72.0 TOBACCO ABUSE: ICD-10-CM

## 2021-03-10 DIAGNOSIS — J44.9 CHRONIC OBSTRUCTIVE PULMONARY DISEASE, UNSPECIFIED COPD TYPE (HCC): ICD-10-CM

## 2021-03-10 LAB
ALBUMIN SERPL-MCNC: 3.5 G/DL (ref 3.5–5)
ALBUMIN/GLOB SERPL: 0.9 {RATIO} (ref 1.1–2.2)
ALP SERPL-CCNC: 94 U/L (ref 45–117)
ALT SERPL-CCNC: 31 U/L (ref 12–78)
ANION GAP SERPL CALC-SCNC: 6 MMOL/L (ref 5–15)
APTT PPP: 24.1 SEC (ref 22.1–31)
AST SERPL-CCNC: 25 U/L (ref 15–37)
ATRIAL RATE: 87 BPM
BASOPHILS # BLD: 0.1 K/UL (ref 0–0.1)
BASOPHILS NFR BLD: 1 % (ref 0–1)
BILIRUB SERPL-MCNC: 0.7 MG/DL (ref 0.2–1)
BNP SERPL-MCNC: 56 PG/ML (ref 0–125)
BUN SERPL-MCNC: 15 MG/DL (ref 6–20)
BUN/CREAT SERPL: 14 (ref 12–20)
CALCIUM SERPL-MCNC: 10 MG/DL (ref 8.5–10.1)
CALCULATED P AXIS, ECG09: 47 DEGREES
CALCULATED R AXIS, ECG10: 69 DEGREES
CALCULATED T AXIS, ECG11: 51 DEGREES
CHLORIDE SERPL-SCNC: 104 MMOL/L (ref 97–108)
CK MB CFR SERPL CALC: 3.6 % (ref 0–2.5)
CK MB SERPL-MCNC: 5.5 NG/ML (ref 5–25)
CK SERPL-CCNC: 151 U/L (ref 39–308)
CO2 SERPL-SCNC: 28 MMOL/L (ref 21–32)
CREAT SERPL-MCNC: 1.1 MG/DL (ref 0.7–1.3)
D DIMER PPP FEU-MCNC: 0.27 MG/L FEU (ref 0–0.65)
DIAGNOSIS, 93000: NORMAL
DIFFERENTIAL METHOD BLD: ABNORMAL
EOSINOPHIL # BLD: 0.2 K/UL (ref 0–0.4)
EOSINOPHIL NFR BLD: 2 % (ref 0–7)
ERYTHROCYTE [DISTWIDTH] IN BLOOD BY AUTOMATED COUNT: 13.2 % (ref 11.5–14.5)
GLOBULIN SER CALC-MCNC: 3.8 G/DL (ref 2–4)
GLUCOSE BLD STRIP.AUTO-MCNC: 193 MG/DL (ref 65–100)
GLUCOSE SERPL-MCNC: 184 MG/DL (ref 65–100)
HCT VFR BLD AUTO: 50 % (ref 36.6–50.3)
HGB BLD-MCNC: 16.7 G/DL (ref 12.1–17)
IMM GRANULOCYTES # BLD AUTO: 0 K/UL (ref 0–0.04)
IMM GRANULOCYTES NFR BLD AUTO: 0 % (ref 0–0.5)
INR PPP: 1 (ref 0.9–1.1)
LYMPHOCYTES # BLD: 2.3 K/UL (ref 0.8–3.5)
LYMPHOCYTES NFR BLD: 20 % (ref 12–49)
MCH RBC QN AUTO: 29.5 PG (ref 26–34)
MCHC RBC AUTO-ENTMCNC: 33.4 G/DL (ref 30–36.5)
MCV RBC AUTO: 88.3 FL (ref 80–99)
MONOCYTES # BLD: 0.9 K/UL (ref 0–1)
MONOCYTES NFR BLD: 8 % (ref 5–13)
NEUTS SEG # BLD: 8 K/UL (ref 1.8–8)
NEUTS SEG NFR BLD: 69 % (ref 32–75)
NRBC # BLD: 0 K/UL (ref 0–0.01)
NRBC BLD-RTO: 0 PER 100 WBC
P-R INTERVAL, ECG05: 152 MS
PLATELET # BLD AUTO: 338 K/UL (ref 150–400)
PMV BLD AUTO: 9.4 FL (ref 8.9–12.9)
POTASSIUM SERPL-SCNC: 4.1 MMOL/L (ref 3.5–5.1)
PROT SERPL-MCNC: 7.3 G/DL (ref 6.4–8.2)
PROTHROMBIN TIME: 9.9 SEC (ref 9–11.1)
Q-T INTERVAL, ECG07: 366 MS
QRS DURATION, ECG06: 90 MS
QTC CALCULATION (BEZET), ECG08: 440 MS
RBC # BLD AUTO: 5.66 M/UL (ref 4.1–5.7)
SERVICE CMNT-IMP: ABNORMAL
SODIUM SERPL-SCNC: 138 MMOL/L (ref 136–145)
THERAPEUTIC RANGE,PTTT: NORMAL SECS (ref 58–77)
TROPONIN I SERPL-MCNC: <0.05 NG/ML
VENTRICULAR RATE, ECG03: 87 BPM
WBC # BLD AUTO: 11.6 K/UL (ref 4.1–11.1)

## 2021-03-10 PROCEDURE — 83880 ASSAY OF NATRIURETIC PEPTIDE: CPT

## 2021-03-10 PROCEDURE — 85379 FIBRIN DEGRADATION QUANT: CPT

## 2021-03-10 PROCEDURE — 36415 COLL VENOUS BLD VENIPUNCTURE: CPT

## 2021-03-10 PROCEDURE — 80053 COMPREHEN METABOLIC PANEL: CPT

## 2021-03-10 PROCEDURE — 82962 GLUCOSE BLOOD TEST: CPT

## 2021-03-10 PROCEDURE — 99285 EMERGENCY DEPT VISIT HI MDM: CPT

## 2021-03-10 PROCEDURE — 70450 CT HEAD/BRAIN W/O DYE: CPT

## 2021-03-10 PROCEDURE — 85025 COMPLETE CBC W/AUTO DIFF WBC: CPT

## 2021-03-10 PROCEDURE — 71045 X-RAY EXAM CHEST 1 VIEW: CPT

## 2021-03-10 PROCEDURE — 74011250636 HC RX REV CODE- 250/636: Performed by: PHYSICIAN ASSISTANT

## 2021-03-10 PROCEDURE — 93005 ELECTROCARDIOGRAM TRACING: CPT

## 2021-03-10 PROCEDURE — 82550 ASSAY OF CK (CPK): CPT

## 2021-03-10 PROCEDURE — 84484 ASSAY OF TROPONIN QUANT: CPT

## 2021-03-10 PROCEDURE — 85610 PROTHROMBIN TIME: CPT

## 2021-03-10 PROCEDURE — 85730 THROMBOPLASTIN TIME PARTIAL: CPT

## 2021-03-10 RX ORDER — ASPIRIN 81 MG/1
81 TABLET ORAL DAILY
Qty: 30 TAB | Refills: 0 | Status: SHIPPED | OUTPATIENT
Start: 2021-03-10

## 2021-03-10 RX ORDER — ATORVASTATIN CALCIUM 20 MG/1
20 TABLET, FILM COATED ORAL
Qty: 30 TAB | Refills: 0 | Status: SHIPPED | OUTPATIENT
Start: 2021-03-10

## 2021-03-10 RX ADMIN — SODIUM CHLORIDE 1000 ML: 9 INJECTION, SOLUTION INTRAVENOUS at 14:09

## 2021-03-10 NOTE — ED TRIAGE NOTES
Reports dizziness, fatigue, chest pain, and SOB x yesterday that worsened today around 1220. Pt reports he is no longer having chest pain but whenever he stands up he feels like he is going to pass out. Pt reports he has had many of these types of episodes over the years and no one has ever been able to tell him what is wrong. Pt  when checked during triage. Helen Alcantar, at bedside.

## 2021-03-10 NOTE — ED PROVIDER NOTES
EMERGENCY DEPARTMENT HISTORY AND PHYSICAL EXAM      Date: 3/10/2021  Patient Name: Sree Peacock    History of Presenting Illness     Chief Complaint   Patient presents with    Dizziness     History Provided By: Patient    HPI: rSee Peacock, 70 y.o. male with medical history significant for COPD, tobacco abuse, diabetes, DVT, coronary artery stent placement/CAD who presents via private vehicle to the ED with cc of acute episode of significant lightheadedness, dizziness, near syncope, shortness of breath generalized fatigue starting at approximately 11 AM today. Endorses that his symptoms have resolved at this time. Patient additionally had episode of slurred speech intermittently while he was in the emergency department lasting a couple of minutes, but speech has normalized at this time. Patient states that his symptoms started when he woke up this morning. States that when he laid down his symptoms improved. States that when he stood back up his symptoms started again. Patient states that he has had the same episodes intermittently since 2014. States that they are typically elicited by standing. He endorses that he has been followed by PCP as well as cardiology and neurology without any specific diagnosis. He is also followed by heme/onc (Dr. Hallie Lundy) for blood clotting disorder; last visit 2018. Patient versus if he has not followed up with his PCP in 2 years. He endorses that he stopped taking his medications over a year ago, stating that he was done with taking medicines. Per chart review, patient had last office visit with PCP on 6/10/2019. During the office visit patient did have an episode of similar symptoms which resolved on their own and patient declined referral to the emergency department at the time. Patient understood that he had multiple episodes while at home and typically they resolve on their own and he does not go to the emergency room.   He specifically denies taking any medications prior to arrival.  No interventions prior to arrival.  He denies chest pain, palpitations, syncope, seizure, numbness, tingling, focal weakness, incontinence, recent fever or illness. He does endorse that he did have a headache this morning, which is resolved at this time. He endorses that he still smokes cigarettes and believes that this may be contributing to his symptoms. PCP: Jignesh Spencer., NP    There are no other complaints, changes, or physical findings at this time. No current facility-administered medications on file prior to encounter. Current Outpatient Medications on File Prior to Encounter   Medication Sig Dispense Refill    benzocaine 20 % mucosal liquid Use as directed 1 Bottle 0    [DISCONTINUED] atorvastatin (LIPITOR) 20 mg tablet Take 1 Tab by mouth nightly. 90 Tab 0    [DISCONTINUED] rivaroxaban (XARELTO) 10 mg tablet Take 1 Tab by mouth two (2) times daily (with meals). Indications: PREVENTION OF PULMONARY THROMBOEMBOLISM RECURRENCE 60 Tab 12    nitroglycerin (NITROSTAT) 0.4 mg SL tablet 1 Tab by SubLINGual route every five (5) minutes as needed for Chest Pain. 15 Tab 0    BLOOD-GLUCOSE METER (FREESTYLE LITE METER) by Does Not Apply route.  glucose blood VI test strips (ASCENSIA AUTODISC VI, ONE TOUCH ULTRA TEST VI) strip Freestyle. Check sugar once daily fasting. E11.65 100 Strip 11    Lancets misc Use as directed. Dx: check sugar once daily. E11.65 freestyle 100 Each 11    fluticasone-salmeterol (ADVAIR) 100-50 mcg/dose diskus inhaler Take 1 Puff by inhalation every twelve (12) hours.  albuterol (PROVENTIL HFA, VENTOLIN HFA, PROAIR HFA) 90 mcg/actuation inhaler Take 2 Puffs by inhalation.  [DISCONTINUED] aspirin delayed-release 81 mg tablet Take  by mouth daily.        Past History     Past Medical History:  Past Medical History:   Diagnosis Date    COPD     bronchitis    Diabetes ([address]     Personal history of DVT (deep vein thrombosis) Past Surgical History:  Past Surgical History:   Procedure Laterality Date    HX CORONARY STENT PLACEMENT      twice, last 2015    AK CARDIAC SURG PROCEDURE UNLIST       Family History:  Family History   Problem Relation Age of Onset    Cancer Mother         breast    Heart Attack Mother      Social History:  Social History     Tobacco Use    Smoking status: Current Every Day Smoker     Packs/day: 1.00    Smokeless tobacco: Never Used   Substance Use Topics    Alcohol use: No     Comment: stopped 1994    Drug use: No     Types: Marijuana     Allergies:  No Known Allergies  Review of Systems   Review of Systems   Constitutional: Positive for fatigue. Negative for activity change, appetite change, chills, diaphoresis, fever and unexpected weight change. HENT: Positive for voice change. Negative for congestion, dental problem, ear discharge, ear pain, facial swelling, postnasal drip, sinus pressure, sneezing and trouble swallowing. Eyes: Negative for photophobia, pain, redness and visual disturbance. Respiratory: Positive for shortness of breath. Negative for cough, chest tightness and wheezing. Cardiovascular: Negative for chest pain, palpitations and leg swelling. Gastrointestinal: Negative for abdominal pain, constipation, diarrhea, nausea and vomiting. Genitourinary: Negative. Negative for dysuria, enuresis, frequency, hematuria and urgency. Musculoskeletal: Negative. Negative for back pain, neck pain and neck stiffness. Skin: Negative. Negative for pallor, rash and wound. Neurological: Positive for dizziness, speech difficulty, light-headedness and headaches. Negative for tremors, seizures, syncope, facial asymmetry, weakness and numbness. Psychiatric/Behavioral: Negative. Negative for confusion. Physical Exam   Physical Exam  Vitals signs and nursing note reviewed. Constitutional:       General: He is not in acute distress. Appearance: Normal appearance.  He is well-developed. He is not ill-appearing, toxic-appearing or diaphoretic. HENT:      Head: Normocephalic and atraumatic. Right Ear: Hearing and external ear normal.      Left Ear: Hearing and external ear normal.      Nose: Nose normal.      Mouth/Throat:      Mouth: Mucous membranes are moist.   Eyes:      General: No visual field deficit. Conjunctiva/sclera: Conjunctivae normal.      Pupils: Pupils are equal, round, and reactive to light. Neck:      Musculoskeletal: Normal range of motion. No neck rigidity. Cardiovascular:      Rate and Rhythm: Normal rate and regular rhythm. Pulses: Normal pulses. Heart sounds: Normal heart sounds. Pulmonary:      Effort: Pulmonary effort is normal. No accessory muscle usage or respiratory distress. Breath sounds: Normal breath sounds. Abdominal:      General: Abdomen is flat. Tenderness: There is no abdominal tenderness. There is no guarding or rebound. Musculoskeletal: Normal range of motion. Skin:     General: Skin is warm and dry. Coloration: Skin is not pale. Neurological:      General: No focal deficit present. Mental Status: He is alert and oriented to person, place, and time. GCS: GCS eye subscore is 4. GCS verbal subscore is 5. GCS motor subscore is 6. Cranial Nerves: No cranial nerve deficit or facial asymmetry. Sensory: Sensation is intact. No sensory deficit. Motor: No weakness, tremor, atrophy, abnormal muscle tone, seizure activity or pronator drift. Coordination: Romberg sign negative. Finger-Nose-Finger Test and Heel to [place] sunil Test normal.      Comments: On initial presentation, patient had intermittent slurred speech which would resolve with bouts of frustration and controlled inappropriate language. Episode of slurred speech only lasted approximately 1 minute and seem to come and go voluntarily. Psychiatric:         Mood and Affect: Affect is angry.          Behavior: Behavior is agitated. Thought Content: Thought content normal.       Diagnostic Study Results   Labs -     Recent Results (from the past 12 hour(s))   GLUCOSE, POC    Collection Time: 03/10/21  1:11 PM   Result Value Ref Range    Glucose (POC) 193 (H) 65 - 100 mg/dL    Performed by Curry Haywood    EKG, 12 LEAD, INITIAL    Collection Time: 03/10/21  1:16 PM   Result Value Ref Range    Ventricular Rate 87 BPM    Atrial Rate 87 BPM    P-R Interval 152 ms    QRS Duration 90 ms    Q-T Interval 366 ms    QTC Calculation (Bezet) 440 ms    Calculated P Axis 47 degrees    Calculated R Axis 69 degrees    Calculated T Axis 51 degrees    Diagnosis       Sinus rhythm with premature atrial complexes  Otherwise normal ECG  When compared with ECG of 14-SEP-2019 18:26,  premature atrial complexes are now present     TROPONIN I    Collection Time: 03/10/21  1:39 PM   Result Value Ref Range    Troponin-I, Qt. <0.05 <0.05 ng/mL   CBC WITH AUTOMATED DIFF    Collection Time: 03/10/21  1:39 PM   Result Value Ref Range    WBC 11.6 (H) 4.1 - 11.1 K/uL    RBC 5.66 4.10 - 5.70 M/uL    HGB 16.7 12.1 - 17.0 g/dL    HCT 50.0 36.6 - 50.3 %    MCV 88.3 80.0 - 99.0 FL    MCH 29.5 26.0 - 34.0 PG    MCHC 33.4 30.0 - 36.5 g/dL    RDW 13.2 11.5 - 14.5 %    PLATELET 246 274 - 256 K/uL    MPV 9.4 8.9 - 12.9 FL    NRBC 0.0 0  WBC    ABSOLUTE NRBC 0.00 0.00 - 0.01 K/uL    NEUTROPHILS 69 32 - 75 %    LYMPHOCYTES 20 12 - 49 %    MONOCYTES 8 5 - 13 %    EOSINOPHILS 2 0 - 7 %    BASOPHILS 1 0 - 1 %    IMMATURE GRANULOCYTES 0 0.0 - 0.5 %    ABS. NEUTROPHILS 8.0 1.8 - 8.0 K/UL    ABS. LYMPHOCYTES 2.3 0.8 - 3.5 K/UL    ABS. MONOCYTES 0.9 0.0 - 1.0 K/UL    ABS. EOSINOPHILS 0.2 0.0 - 0.4 K/UL    ABS. BASOPHILS 0.1 0.0 - 0.1 K/UL    ABS. IMM.  GRANS. 0.0 0.00 - 0.04 K/UL    DF AUTOMATED     METABOLIC PANEL, COMPREHENSIVE    Collection Time: 03/10/21  1:39 PM   Result Value Ref Range    Sodium 138 136 - 145 mmol/L    Potassium 4.1 3.5 - 5.1 mmol/L    Chloride 104 97 - 108 mmol/L    CO2 28 21 - 32 mmol/L    Anion gap 6 5 - 15 mmol/L    Glucose 184 (H) 65 - 100 mg/dL    BUN 15 6 - 20 MG/DL    Creatinine 1.10 0.70 - 1.30 MG/DL    BUN/Creatinine ratio 14 12 - 20      GFR est AA >60 >60 ml/min/1.73m2    GFR est non-AA >60 >60 ml/min/1.73m2    Calcium 10.0 8.5 - 10.1 MG/DL    Bilirubin, total 0.7 0.2 - 1.0 MG/DL    ALT (SGPT) 31 12 - 78 U/L    AST (SGOT) 25 15 - 37 U/L    Alk. phosphatase 94 45 - 117 U/L    Protein, total 7.3 6.4 - 8.2 g/dL    Albumin 3.5 3.5 - 5.0 g/dL    Globulin 3.8 2.0 - 4.0 g/dL    A-G Ratio 0.9 (L) 1.1 - 2.2     CK W/ CKMB & INDEX    Collection Time: 03/10/21  1:39 PM   Result Value Ref Range    CK - MB 5.5 (H) <3.6 NG/ML    CK-MB Index 3.6 (H) 0.0 - 2.5       39 - 308 U/L   NT-PRO BNP    Collection Time: 03/10/21  1:39 PM   Result Value Ref Range    NT pro-BNP 56 0 - 125 PG/ML   D DIMER    Collection Time: 03/10/21  1:39 PM   Result Value Ref Range    D-dimer 0.27 0.00 - 0.65 mg/L FEU   PROTHROMBIN TIME + INR    Collection Time: 03/10/21  1:39 PM   Result Value Ref Range    INR 1.0 0.9 - 1.1      Prothrombin time 9.9 9.0 - 11.1 sec   PTT    Collection Time: 03/10/21  1:39 PM   Result Value Ref Range    aPTT 24.1 22.1 - 31.0 sec    aPTT, therapeutic range     58.0 - 77.0 SECS       Radiologic Studies -   XR CHEST SNGL V   Final Result   Clear lungs. CT HEAD WO CONT   Final Result   No acute intracranial process. Imaging findings consistent with minimal chronic microvascular ischemic change. There is a moderate degree of cerebral atrophy. Xr Chest Sngl V    Result Date: 3/10/2021  Clear lungs. Ct Head Wo Cont    Result Date: 3/10/2021  No acute intracranial process. Imaging findings consistent with minimal chronic microvascular ischemic change. There is a moderate degree of cerebral atrophy. Medical Decision Making   I am the first provider for this patient.     I reviewed the vital signs, available nursing notes, past medical history, past surgical history, family history and social history. Vital Signs-Reviewed the patient's vital signs. Patient Vitals for the past 24 hrs:   Temp Pulse Resp BP SpO2   03/10/21 1411    127/72    03/10/21 1351    112/62    03/10/21 1305 98.4 °F (36.9 °C) 82 18 (!) 148/72 95 %     Pulse Oximetry Analysis - 95% on RA (normal)    Cardiac Monitor:   Rate: 78 bpm  Rhythm: Normal Sinus Rhythm      EKG interpretation: (Preliminary)  Rhythm: normal sinus rhythm and PAC's; and regular . Rate (approx.): 87; Axis: normal; NJ interval: normal; QRS interval: normal ; ST/T wave: normal; Other findings: unchanged from previous ekg. Records Reviewed: Nursing Notes, Old Medical Records, Previous electrocardiograms, Previous Radiology Studies and Previous Laboratory Studies    Provider Notes (Medical Decision Making):   Pt presents with episodes of intermittent lightheadedness, near syncope, slurred speech lasting a couple of hours and resolving on their; symptoms have started in 2014 patient has been worked up by cardiology, neurology, hematology. Onset of symptoms today started at 11 AM and resolved during interview and emergency room. DDx: dehydration, anemia, electrolyte abnormality, infection, orthostatic hypotension, medication side effect, CVA, TIA. Will get orthostatics, labs, imaging and EKG PRN. Patient is currently asymptomatic at this time with no focal deficits on neuro exam.  Plan to continue to monitor in the emergency department. TOBACCO COUNSELING:  Upon evaluation, pt expressed that they are a current tobacco user. Pt has been counseled on the dangers of smoking and was encouraged to quit as soon as possible in order to decrease further risks to their health. Pt has conveyed their understanding of the risks involved should they continue to use tobacco products. 5 min discussion. 2:36 PM  I spoke with NP Chad Hawthorne, Consult for Primary Care.  Discussed available diagnostic tests and clinical findings. She is in agreement with care plans as outlined. She recommends restarting patient on statin therapy and scheduling close follow-up with herself and neurology. Dallin Pelaez PA-C    2:58 PM  Pt resting comfortably in room in NAD. No new symptoms or complaints at this time. Available labs/ results reviewed with pt. patient continues to deny any symptoms at this time and endorses that he is feeling well. He appears to be in a pleasant mood. He endorses that he will follow care plan to see primary care and neurology. Additionally he is in agreement of restarting his medications and attempting to quit smoking. Case management has been consulted and will schedule appointments with PCP as well as neurology. ED Course:   Initial assessment performed. The patients presenting problems have been discussed, and they are in agreement with the care plan formulated and outlined with them. I have encouraged them to ask questions as they arise throughout their visit. Progress Note:   Updated pt on all returned results and findings. Discussed the importance of proper follow up as referred below along with return precautions. Pt in agreement with the care plan and expresses agreement with and understanding of all items discussed. Disposition:  3:04 PM  I have discussed with patient their diagnosis, treatment, and follow up plan. The patient agrees to follow up as outlined in discharge paperwork and also to return to the ED with any worsening. Dallin Pelaez PA-C      PLAN:  1. Current Discharge Medication List      CONTINUE these medications which have CHANGED    Details   aspirin delayed-release 81 mg tablet Take 1 Tab by mouth daily. Qty: 30 Tab, Refills: 0      atorvastatin (LIPITOR) 20 mg tablet Take 1 Tab by mouth nightly. Qty: 30 Tab, Refills: 0           2.    Follow-up Information     Follow up With Specialties Details Why Contact Vinay Gar ANABELA NP Nurse Practitioner On 3/16/2021 Your appointment time is 0900, Bring a MASK, Please arrive 15 mins early, Bring  INS card, picture ID,and discharge papers, Please keep this appointment Kobi Ram  [phone]      [facility] at [facility]   [address] [facility], [address] [place] [address], [address] [place] [address]  [phone]        Return to ED if worse     Diagnosis     Clinical Impression:   1. Cerebral atrophy ([address]    2. Intermittent lightheadedness    3. Coronary artery disease involving native heart with angina pectoris, unspecified vessel or lesion type ([address]    4. Chronic obstructive pulmonary disease, unspecified COPD type ([address]    5. Tobacco abuse            Please note that this dictation was completed with Dragon, computer voice recognition software. Quite often unanticipated grammatical, syntax, homophones, and other interpretive errors are inadvertently transcribed by the computer software. Please disregard these errors. Additionally, please excuse any errors that have escaped final proofreading.

## 2021-03-10 NOTE — PROGRESS NOTES
AC[facility] Core Measure patient. CM opened case for assessment of D/C planning needs, CM reviewed chart. Patient needs follow-up with PCP and neurology. Follow up with Nolan Rodriguez.  Zully Plaza NP on 3/16/2021   Specialty: Nurse Practitioner   Kobi Owens   [address] [address]   [phone]   Your appointment time is 0900, Bring a MASK, Please arrive 15 mins early, Bring  INS card, picture ID,and discharge papers, Please keep this appointment     Follow up with Neurology Clinic at [facility] on 4/16/2021   Specialty: Neurology   [address] [facility], [facility], [facility]   [phone]   Your appointment time is 0800, Bring a MASK, Please arrive 15 mins early, Bring  INS card, picture ID,and discharge papers, Please keep this appointment    [address] [facility]  [phone]

## 2021-03-10 NOTE — ED NOTES
Pt arrives to the ED AAOX4 with a c/c of SOB, CP, HA, dizziness, and fatigue onset yesterday. Pt states his symptoms are now mostly resolved, but reports intermittent dizziness with standing. Pt states he sometimes has trouble speaking. Pt states he has had similar episodes \"multiple times\" since 2014 but they have not been able to diagnose him with \"anything. \" Pt is noted in stable condition, now in ED room with side rail up, bed to lowest position and call light within reach. ED provider at bedside to evaluate, will continue to monitor. Emergency Department Nursing Plan of Care       The Nursing Plan of Care is developed from the Nursing assessment and Emergency Department Attending provider initial evaluation. The plan of care may be reviewed in the ED Provider note.     The Plan of Care was developed with the following considerations:   Patient / Family readiness to learn indicated by:verbalized understanding  Persons(s) to be included in education: patient  Barriers to Learning/Limitations:No    Signed     Jarad Cancel    3/10/2021   1:22 PM

## 2022-03-18 PROBLEM — E78.1 HYPERTRIGLYCERIDEMIA: Status: ACTIVE | Noted: 2018-01-15

## 2022-03-19 PROBLEM — F17.200 SMOKER: Status: ACTIVE | Noted: 2018-01-12

## 2022-03-19 PROBLEM — E11.9 CONTROLLED TYPE 2 DIABETES MELLITUS WITHOUT COMPLICATION, WITHOUT LONG-TERM CURRENT USE OF INSULIN (HCC): Status: ACTIVE | Noted: 2018-01-12

## 2022-03-19 PROBLEM — I25.10 CORONARY ARTERY DISEASE INVOLVING NATIVE CORONARY ARTERY WITHOUT ANGINA PECTORIS: Status: ACTIVE | Noted: 2018-01-12

## 2023-05-10 RX ORDER — LANCETS 30 GAUGE
EACH MISCELLANEOUS
COMMUNITY
Start: 2018-01-12

## 2023-05-10 RX ORDER — ATORVASTATIN CALCIUM 20 MG/1
TABLET, FILM COATED ORAL
COMMUNITY
Start: 2021-03-10

## 2023-05-10 RX ORDER — NITROGLYCERIN 0.4 MG/1
TABLET SUBLINGUAL
COMMUNITY
Start: 2018-02-27

## 2023-05-10 RX ORDER — ALBUTEROL SULFATE 90 UG/1
2 AEROSOL, METERED RESPIRATORY (INHALATION)
COMMUNITY

## 2023-05-10 RX ORDER — ASPIRIN 81 MG/1
TABLET ORAL DAILY
COMMUNITY
Start: 2021-03-10

## 2023-05-10 RX ORDER — FLUTICASONE PROPIONATE AND SALMETEROL 100; 50 UG/1; UG/1
1 POWDER RESPIRATORY (INHALATION) EVERY 12 HOURS
COMMUNITY